# Patient Record
Sex: MALE | Race: WHITE | Employment: OTHER | URBAN - METROPOLITAN AREA
[De-identification: names, ages, dates, MRNs, and addresses within clinical notes are randomized per-mention and may not be internally consistent; named-entity substitution may affect disease eponyms.]

---

## 2017-05-24 DIAGNOSIS — E03.9 HYPOTHYROIDISM: ICD-10-CM

## 2017-05-25 ENCOUNTER — TELEPHONE (OUTPATIENT)
Dept: INTERNAL MEDICINE CLINIC | Age: 77
End: 2017-05-25

## 2017-05-25 ENCOUNTER — NURSE ONLY (OUTPATIENT)
Dept: INTERNAL MEDICINE CLINIC | Age: 77
End: 2017-05-25

## 2017-05-25 DIAGNOSIS — E03.8 OTHER SPECIFIED HYPOTHYROIDISM: Primary | ICD-10-CM

## 2017-05-25 DIAGNOSIS — E03.8 OTHER SPECIFIED HYPOTHYROIDISM: ICD-10-CM

## 2017-05-25 DIAGNOSIS — Z00.00 ROUTINE GENERAL MEDICAL EXAMINATION AT A HEALTH CARE FACILITY: ICD-10-CM

## 2017-05-25 LAB
A/G RATIO: 1.8 (ref 1.1–2.2)
ALBUMIN SERPL-MCNC: 4.3 G/DL (ref 3.4–5)
ALP BLD-CCNC: 48 U/L (ref 40–129)
ALT SERPL-CCNC: 18 U/L (ref 10–40)
ANION GAP SERPL CALCULATED.3IONS-SCNC: 15 MMOL/L (ref 3–16)
AST SERPL-CCNC: 17 U/L (ref 15–37)
BILIRUB SERPL-MCNC: 0.6 MG/DL (ref 0–1)
BUN BLDV-MCNC: 23 MG/DL (ref 7–20)
CALCIUM SERPL-MCNC: 8.7 MG/DL (ref 8.3–10.6)
CHLORIDE BLD-SCNC: 103 MMOL/L (ref 99–110)
CHOLESTEROL, TOTAL: 163 MG/DL (ref 0–199)
CO2: 23 MMOL/L (ref 21–32)
CREAT SERPL-MCNC: 1.1 MG/DL (ref 0.8–1.3)
GFR AFRICAN AMERICAN: >60
GFR NON-AFRICAN AMERICAN: >60
GLOBULIN: 2.4 G/DL
GLUCOSE BLD-MCNC: 101 MG/DL (ref 70–99)
HCT VFR BLD CALC: 39.8 % (ref 40.5–52.5)
HDLC SERPL-MCNC: 57 MG/DL (ref 40–60)
HEMOGLOBIN: 13.2 G/DL (ref 13.5–17.5)
LDL CHOLESTEROL CALCULATED: 96 MG/DL
MCH RBC QN AUTO: 31.3 PG (ref 26–34)
MCHC RBC AUTO-ENTMCNC: 33.3 G/DL (ref 31–36)
MCV RBC AUTO: 94.1 FL (ref 80–100)
PDW BLD-RTO: 14.7 % (ref 12.4–15.4)
PLATELET # BLD: 221 K/UL (ref 135–450)
PMV BLD AUTO: 8.5 FL (ref 5–10.5)
POTASSIUM SERPL-SCNC: 4.6 MMOL/L (ref 3.5–5.1)
RBC # BLD: 4.23 M/UL (ref 4.2–5.9)
SODIUM BLD-SCNC: 141 MMOL/L (ref 136–145)
TOTAL PROTEIN: 6.7 G/DL (ref 6.4–8.2)
TRIGL SERPL-MCNC: 52 MG/DL (ref 0–150)
TSH SERPL DL<=0.05 MIU/L-ACNC: 3.25 UIU/ML (ref 0.27–4.2)
VLDLC SERPL CALC-MCNC: 10 MG/DL
WBC # BLD: 5.6 K/UL (ref 4–11)

## 2017-05-25 PROCEDURE — 36415 COLL VENOUS BLD VENIPUNCTURE: CPT | Performed by: INTERNAL MEDICINE

## 2017-05-25 RX ORDER — LEVOTHYROXINE SODIUM 0.07 MG/1
TABLET ORAL
Qty: 90 TABLET | Refills: 2 | Status: SHIPPED | OUTPATIENT
Start: 2017-05-25 | End: 2018-02-12 | Stop reason: SDUPTHER

## 2017-06-06 ENCOUNTER — OFFICE VISIT (OUTPATIENT)
Dept: INTERNAL MEDICINE CLINIC | Age: 77
End: 2017-06-06

## 2017-06-06 VITALS
WEIGHT: 178.5 LBS | DIASTOLIC BLOOD PRESSURE: 68 MMHG | TEMPERATURE: 97.7 F | BODY MASS INDEX: 25.8 KG/M2 | HEART RATE: 72 BPM | SYSTOLIC BLOOD PRESSURE: 120 MMHG

## 2017-06-06 DIAGNOSIS — M70.31 BURSITIS OF RIGHT ELBOW: Primary | ICD-10-CM

## 2017-06-06 DIAGNOSIS — D64.9 ANEMIA, UNSPECIFIED TYPE: ICD-10-CM

## 2017-06-06 PROCEDURE — 99214 OFFICE O/P EST MOD 30 MIN: CPT | Performed by: INTERNAL MEDICINE

## 2017-06-06 RX ORDER — DOXYCYCLINE HYCLATE 100 MG
100 TABLET ORAL 2 TIMES DAILY
Qty: 20 TABLET | Refills: 0 | Status: SHIPPED | OUTPATIENT
Start: 2017-06-06 | End: 2017-06-16

## 2017-06-06 ASSESSMENT — ENCOUNTER SYMPTOMS
COLOR CHANGE: 1
BACK PAIN: 0
SINUS PRESSURE: 0
EYES NEGATIVE: 1
CHOKING: 0
APNEA: 0
STRIDOR: 0

## 2017-08-14 DIAGNOSIS — N40.0 BPH (BENIGN PROSTATIC HYPERPLASIA): ICD-10-CM

## 2017-08-14 RX ORDER — TAMSULOSIN HYDROCHLORIDE 0.4 MG/1
CAPSULE ORAL
Qty: 90 CAPSULE | Refills: 1 | Status: SHIPPED | OUTPATIENT
Start: 2017-08-14 | End: 2018-02-09 | Stop reason: SDUPTHER

## 2017-09-14 ENCOUNTER — OFFICE VISIT (OUTPATIENT)
Dept: INTERNAL MEDICINE CLINIC | Age: 77
End: 2017-09-14

## 2017-09-14 VITALS
HEART RATE: 82 BPM | DIASTOLIC BLOOD PRESSURE: 80 MMHG | TEMPERATURE: 98.3 F | HEIGHT: 72 IN | OXYGEN SATURATION: 95 % | WEIGHT: 175 LBS | SYSTOLIC BLOOD PRESSURE: 130 MMHG | BODY MASS INDEX: 23.7 KG/M2

## 2017-09-14 DIAGNOSIS — J98.8 VIRAL RESPIRATORY ILLNESS: Primary | ICD-10-CM

## 2017-09-14 DIAGNOSIS — B97.89 VIRAL RESPIRATORY ILLNESS: Primary | ICD-10-CM

## 2017-09-14 DIAGNOSIS — J30.9 ALLERGIC RHINITIS, UNSPECIFIED ALLERGIC RHINITIS TRIGGER, UNSPECIFIED RHINITIS SEASONALITY: ICD-10-CM

## 2017-09-14 PROCEDURE — 99213 OFFICE O/P EST LOW 20 MIN: CPT | Performed by: NURSE PRACTITIONER

## 2017-09-14 RX ORDER — FLUTICASONE PROPIONATE 50 MCG
1 SPRAY, SUSPENSION (ML) NASAL 2 TIMES DAILY
Qty: 1 BOTTLE | Refills: 0 | Status: SHIPPED | OUTPATIENT
Start: 2017-09-14 | End: 2018-05-22

## 2017-09-14 RX ORDER — LORATADINE AND PSEUDOEPHEDRINE 10; 240 MG/1; MG/1
1 TABLET, EXTENDED RELEASE ORAL DAILY
Qty: 30 TABLET | Refills: 0 | Status: SHIPPED | OUTPATIENT
Start: 2017-09-14

## 2017-09-14 ASSESSMENT — PATIENT HEALTH QUESTIONNAIRE - PHQ9
SUM OF ALL RESPONSES TO PHQ QUESTIONS 1-9: 0
1. LITTLE INTEREST OR PLEASURE IN DOING THINGS: 0
SUM OF ALL RESPONSES TO PHQ9 QUESTIONS 1 & 2: 0
2. FEELING DOWN, DEPRESSED OR HOPELESS: 0

## 2017-09-14 ASSESSMENT — ENCOUNTER SYMPTOMS
NAUSEA: 0
SORE THROAT: 1
DIARRHEA: 0
WHEEZING: 0
COUGH: 1
RHINORRHEA: 1

## 2017-12-22 ENCOUNTER — TELEPHONE (OUTPATIENT)
Dept: ADMINISTRATIVE | Age: 77
End: 2017-12-22

## 2017-12-22 DIAGNOSIS — D64.9 ANEMIA, UNSPECIFIED TYPE: Primary | ICD-10-CM

## 2017-12-22 DIAGNOSIS — E03.8 OTHER SPECIFIED HYPOTHYROIDISM: ICD-10-CM

## 2017-12-22 NOTE — TELEPHONE ENCOUNTER
Please apply the appropriate labs for this patient's Thyroid check. Luís Gan is scheduled for 12/26/17 at 8:30am. Thank you.

## 2017-12-26 ENCOUNTER — NURSE ONLY (OUTPATIENT)
Dept: INTERNAL MEDICINE CLINIC | Age: 77
End: 2017-12-26

## 2017-12-26 DIAGNOSIS — D64.9 ANEMIA, UNSPECIFIED TYPE: ICD-10-CM

## 2017-12-26 DIAGNOSIS — E03.8 OTHER SPECIFIED HYPOTHYROIDISM: ICD-10-CM

## 2017-12-26 LAB
HCT VFR BLD CALC: 41.5 % (ref 40.5–52.5)
HEMOGLOBIN: 13.8 G/DL (ref 13.5–17.5)
MCH RBC QN AUTO: 31.6 PG (ref 26–34)
MCHC RBC AUTO-ENTMCNC: 33.2 G/DL (ref 31–36)
MCV RBC AUTO: 95.3 FL (ref 80–100)
PDW BLD-RTO: 15 % (ref 12.4–15.4)
PLATELET # BLD: 236 K/UL (ref 135–450)
PMV BLD AUTO: 8.4 FL (ref 5–10.5)
RBC # BLD: 4.35 M/UL (ref 4.2–5.9)
TSH SERPL DL<=0.05 MIU/L-ACNC: 2.72 UIU/ML (ref 0.27–4.2)
WBC # BLD: 6.5 K/UL (ref 4–11)

## 2017-12-26 PROCEDURE — 36415 COLL VENOUS BLD VENIPUNCTURE: CPT | Performed by: INTERNAL MEDICINE

## 2018-02-09 DIAGNOSIS — N40.0 BPH (BENIGN PROSTATIC HYPERPLASIA): ICD-10-CM

## 2018-02-09 RX ORDER — TAMSULOSIN HYDROCHLORIDE 0.4 MG/1
CAPSULE ORAL
Qty: 90 CAPSULE | Refills: 0 | Status: SHIPPED | OUTPATIENT
Start: 2018-02-09 | End: 2018-02-12 | Stop reason: SDUPTHER

## 2018-02-12 DIAGNOSIS — E03.9 HYPOTHYROIDISM, UNSPECIFIED TYPE: ICD-10-CM

## 2018-02-12 RX ORDER — TAMSULOSIN HYDROCHLORIDE 0.4 MG/1
CAPSULE ORAL
Qty: 90 CAPSULE | Refills: 0 | Status: SHIPPED | OUTPATIENT
Start: 2018-02-12 | End: 2018-08-09 | Stop reason: SDUPTHER

## 2018-02-12 RX ORDER — LEVOTHYROXINE SODIUM 0.07 MG/1
TABLET ORAL
Qty: 90 TABLET | Refills: 2 | Status: SHIPPED | OUTPATIENT
Start: 2018-02-12 | End: 2018-02-21 | Stop reason: SDUPTHER

## 2018-02-21 DIAGNOSIS — E03.9 HYPOTHYROIDISM, UNSPECIFIED TYPE: ICD-10-CM

## 2018-02-22 RX ORDER — LEVOTHYROXINE SODIUM 0.07 MG/1
TABLET ORAL
Qty: 90 TABLET | Refills: 1 | Status: SHIPPED | OUTPATIENT
Start: 2018-02-22 | End: 2018-11-07 | Stop reason: SDUPTHER

## 2018-04-02 ENCOUNTER — OFFICE VISIT (OUTPATIENT)
Dept: INTERNAL MEDICINE CLINIC | Age: 78
End: 2018-04-02

## 2018-04-02 VITALS
HEART RATE: 75 BPM | DIASTOLIC BLOOD PRESSURE: 82 MMHG | WEIGHT: 186 LBS | TEMPERATURE: 98.3 F | SYSTOLIC BLOOD PRESSURE: 130 MMHG | OXYGEN SATURATION: 96 % | BODY MASS INDEX: 25.23 KG/M2

## 2018-04-02 DIAGNOSIS — M70.22 OLECRANON BURSITIS OF LEFT ELBOW: Primary | ICD-10-CM

## 2018-04-02 DIAGNOSIS — Z91.81 AT HIGH RISK FOR FALLS: ICD-10-CM

## 2018-04-02 DIAGNOSIS — J45.20 MILD INTERMITTENT ASTHMA WITHOUT COMPLICATION: ICD-10-CM

## 2018-04-02 DIAGNOSIS — E03.8 OTHER SPECIFIED HYPOTHYROIDISM: ICD-10-CM

## 2018-04-02 PROCEDURE — 20605 DRAIN/INJ JOINT/BURSA W/O US: CPT | Performed by: INTERNAL MEDICINE

## 2018-04-02 PROCEDURE — 99214 OFFICE O/P EST MOD 30 MIN: CPT | Performed by: INTERNAL MEDICINE

## 2018-04-02 RX ORDER — METHYLPREDNISOLONE ACETATE 80 MG/ML
80 INJECTION, SUSPENSION INTRA-ARTICULAR; INTRALESIONAL; INTRAMUSCULAR; SOFT TISSUE ONCE
Status: COMPLETED | OUTPATIENT
Start: 2018-04-02 | End: 2018-04-02

## 2018-04-02 RX ADMIN — METHYLPREDNISOLONE ACETATE 80 MG: 80 INJECTION, SUSPENSION INTRA-ARTICULAR; INTRALESIONAL; INTRAMUSCULAR; SOFT TISSUE at 15:28

## 2018-04-02 ASSESSMENT — ENCOUNTER SYMPTOMS
CRAMPS: 1
COLOR CHANGE: 1

## 2018-05-07 ENCOUNTER — OFFICE VISIT (OUTPATIENT)
Dept: INTERNAL MEDICINE CLINIC | Age: 78
End: 2018-05-07

## 2018-05-07 VITALS
TEMPERATURE: 98 F | SYSTOLIC BLOOD PRESSURE: 130 MMHG | HEIGHT: 72 IN | DIASTOLIC BLOOD PRESSURE: 80 MMHG | BODY MASS INDEX: 24.65 KG/M2 | WEIGHT: 182 LBS

## 2018-05-07 DIAGNOSIS — E03.8 OTHER SPECIFIED HYPOTHYROIDISM: ICD-10-CM

## 2018-05-07 DIAGNOSIS — C44.91 BASAL CELL CARCINOMA, UNSPECIFIED SITE: ICD-10-CM

## 2018-05-07 DIAGNOSIS — L27.0: Primary | ICD-10-CM

## 2018-05-07 PROCEDURE — 99214 OFFICE O/P EST MOD 30 MIN: CPT | Performed by: INTERNAL MEDICINE

## 2018-05-07 RX ORDER — BETAMETHASONE DIPROPIONATE 0.5 MG/G
CREAM TOPICAL
Qty: 15 G | Refills: 1 | Status: SHIPPED | OUTPATIENT
Start: 2018-05-07 | End: 2018-06-06

## 2018-05-07 ASSESSMENT — ENCOUNTER SYMPTOMS
COLOR CHANGE: 1
VOMITING: 0
RESPIRATORY NEGATIVE: 1

## 2018-05-22 ENCOUNTER — PAT TELEPHONE (OUTPATIENT)
Dept: PREADMISSION TESTING | Age: 78
End: 2018-05-22

## 2018-05-22 VITALS — BODY MASS INDEX: 23.7 KG/M2 | HEIGHT: 72 IN | WEIGHT: 175 LBS

## 2018-05-30 ENCOUNTER — HOSPITAL ENCOUNTER (OUTPATIENT)
Dept: ENDOSCOPY | Age: 78
Discharge: HOME | End: 2018-05-30
Attending: INTERNAL MEDICINE | Admitting: INTERNAL MEDICINE

## 2018-05-30 VITALS
RESPIRATION RATE: 16 BRPM | HEART RATE: 57 BPM | DIASTOLIC BLOOD PRESSURE: 65 MMHG | OXYGEN SATURATION: 97 % | HEIGHT: 72 IN | TEMPERATURE: 97.4 F | BODY MASS INDEX: 23.5 KG/M2 | WEIGHT: 173.5 LBS | SYSTOLIC BLOOD PRESSURE: 127 MMHG

## 2018-05-30 DIAGNOSIS — Z80.0 FAMILY HISTORY OF MALIGNANT NEOPLASM OF DIGESTIVE ORGAN: ICD-10-CM

## 2018-05-30 RX ORDER — SODIUM CHLORIDE 9 MG/ML
INJECTION, SOLUTION INTRAVENOUS CONTINUOUS
Status: DISCONTINUED | OUTPATIENT
Start: 2018-05-30 | End: 2018-05-31 | Stop reason: HOSPADM

## 2018-05-30 RX ORDER — PROMETHAZINE HYDROCHLORIDE 25 MG/ML
6.25 INJECTION, SOLUTION INTRAMUSCULAR; INTRAVENOUS
Status: ACTIVE | OUTPATIENT
Start: 2018-05-30 | End: 2018-05-30

## 2018-05-30 RX ORDER — ONDANSETRON 2 MG/ML
4 INJECTION INTRAMUSCULAR; INTRAVENOUS
Status: ACTIVE | OUTPATIENT
Start: 2018-05-30 | End: 2018-05-30

## 2018-05-30 RX ORDER — SODIUM CHLORIDE 0.9 % (FLUSH) 0.9 %
10 SYRINGE (ML) INJECTION PRN
Status: DISCONTINUED | OUTPATIENT
Start: 2018-05-30 | End: 2018-05-31 | Stop reason: HOSPADM

## 2018-05-30 RX ORDER — LABETALOL HYDROCHLORIDE 5 MG/ML
5 INJECTION, SOLUTION INTRAVENOUS EVERY 10 MIN PRN
Status: DISCONTINUED | OUTPATIENT
Start: 2018-05-30 | End: 2018-05-31 | Stop reason: HOSPADM

## 2018-05-30 RX ORDER — SODIUM CHLORIDE 0.9 % (FLUSH) 0.9 %
10 SYRINGE (ML) INJECTION EVERY 12 HOURS SCHEDULED
Status: DISCONTINUED | OUTPATIENT
Start: 2018-05-30 | End: 2018-05-31 | Stop reason: HOSPADM

## 2018-05-30 RX ADMIN — SODIUM CHLORIDE: 9 INJECTION, SOLUTION INTRAVENOUS at 08:50

## 2018-05-30 ASSESSMENT — PAIN - FUNCTIONAL ASSESSMENT: PAIN_FUNCTIONAL_ASSESSMENT: 0-10

## 2018-05-30 ASSESSMENT — PAIN SCALES - GENERAL
PAINLEVEL_OUTOF10: 0
PAINLEVEL_OUTOF10: 0

## 2018-05-30 ASSESSMENT — PAIN SCALES - WONG BAKER: WONGBAKER_NUMERICALRESPONSE: 0

## 2018-08-09 RX ORDER — TAMSULOSIN HYDROCHLORIDE 0.4 MG/1
CAPSULE ORAL
Qty: 90 CAPSULE | Refills: 0 | Status: SHIPPED | OUTPATIENT
Start: 2018-08-09 | End: 2018-11-05 | Stop reason: SDUPTHER

## 2018-11-05 RX ORDER — TAMSULOSIN HYDROCHLORIDE 0.4 MG/1
CAPSULE ORAL
Qty: 90 CAPSULE | Refills: 0 | Status: SHIPPED | OUTPATIENT
Start: 2018-11-05 | End: 2019-02-06 | Stop reason: SDUPTHER

## 2018-11-07 DIAGNOSIS — E03.9 HYPOTHYROIDISM, UNSPECIFIED TYPE: ICD-10-CM

## 2018-11-08 RX ORDER — LEVOTHYROXINE SODIUM 0.07 MG/1
TABLET ORAL
Qty: 90 TABLET | Refills: 0 | Status: SHIPPED | OUTPATIENT
Start: 2018-11-08 | End: 2019-02-06 | Stop reason: SDUPTHER

## 2018-11-12 ENCOUNTER — NURSE ONLY (OUTPATIENT)
Dept: INTERNAL MEDICINE CLINIC | Age: 78
End: 2018-11-12
Payer: MEDICARE

## 2018-11-12 ENCOUNTER — TELEPHONE (OUTPATIENT)
Dept: INTERNAL MEDICINE CLINIC | Age: 78
End: 2018-11-12

## 2018-11-12 DIAGNOSIS — D64.9 ANEMIA, UNSPECIFIED TYPE: ICD-10-CM

## 2018-11-12 DIAGNOSIS — Z00.00 ROUTINE GENERAL MEDICAL EXAMINATION AT A HEALTH CARE FACILITY: Primary | ICD-10-CM

## 2018-11-12 DIAGNOSIS — E03.8 OTHER SPECIFIED HYPOTHYROIDISM: ICD-10-CM

## 2018-11-12 DIAGNOSIS — Z00.00 ROUTINE GENERAL MEDICAL EXAMINATION AT A HEALTH CARE FACILITY: ICD-10-CM

## 2018-11-12 LAB
A/G RATIO: 1.7 (ref 1.1–2.2)
ALBUMIN SERPL-MCNC: 4.4 G/DL (ref 3.4–5)
ALP BLD-CCNC: 55 U/L (ref 40–129)
ALT SERPL-CCNC: 16 U/L (ref 10–40)
ANION GAP SERPL CALCULATED.3IONS-SCNC: 16 MMOL/L (ref 3–16)
AST SERPL-CCNC: 19 U/L (ref 15–37)
BILIRUB SERPL-MCNC: 0.4 MG/DL (ref 0–1)
BUN BLDV-MCNC: 20 MG/DL (ref 7–20)
CALCIUM SERPL-MCNC: 9.1 MG/DL (ref 8.3–10.6)
CHLORIDE BLD-SCNC: 105 MMOL/L (ref 99–110)
CHOLESTEROL, TOTAL: 160 MG/DL (ref 0–199)
CO2: 22 MMOL/L (ref 21–32)
CREAT SERPL-MCNC: 1.2 MG/DL (ref 0.8–1.3)
GFR AFRICAN AMERICAN: >60
GFR NON-AFRICAN AMERICAN: 59
GLOBULIN: 2.6 G/DL
GLUCOSE BLD-MCNC: 155 MG/DL (ref 70–99)
HCT VFR BLD CALC: 42.1 % (ref 40.5–52.5)
HDLC SERPL-MCNC: 58 MG/DL (ref 40–60)
HEMOGLOBIN: 13.7 G/DL (ref 13.5–17.5)
LDL CHOLESTEROL CALCULATED: 88 MG/DL
MCH RBC QN AUTO: 31.7 PG (ref 26–34)
MCHC RBC AUTO-ENTMCNC: 32.6 G/DL (ref 31–36)
MCV RBC AUTO: 97 FL (ref 80–100)
PDW BLD-RTO: 15.1 % (ref 12.4–15.4)
PLATELET # BLD: 257 K/UL (ref 135–450)
PMV BLD AUTO: 8.8 FL (ref 5–10.5)
POTASSIUM SERPL-SCNC: 4.7 MMOL/L (ref 3.5–5.1)
RBC # BLD: 4.34 M/UL (ref 4.2–5.9)
SODIUM BLD-SCNC: 143 MMOL/L (ref 136–145)
TOTAL PROTEIN: 7 G/DL (ref 6.4–8.2)
TRIGL SERPL-MCNC: 72 MG/DL (ref 0–150)
TSH SERPL DL<=0.05 MIU/L-ACNC: 3.04 UIU/ML (ref 0.27–4.2)
VLDLC SERPL CALC-MCNC: 14 MG/DL
WBC # BLD: 5.9 K/UL (ref 4–11)

## 2018-11-12 PROCEDURE — 36415 COLL VENOUS BLD VENIPUNCTURE: CPT | Performed by: INTERNAL MEDICINE

## 2018-11-20 ENCOUNTER — TELEPHONE (OUTPATIENT)
Dept: INTERNAL MEDICINE CLINIC | Age: 78
End: 2018-11-20

## 2018-11-20 ENCOUNTER — OFFICE VISIT (OUTPATIENT)
Dept: INTERNAL MEDICINE CLINIC | Age: 78
End: 2018-11-20
Payer: MEDICARE

## 2018-11-20 VITALS
BODY MASS INDEX: 24.82 KG/M2 | DIASTOLIC BLOOD PRESSURE: 72 MMHG | TEMPERATURE: 98.3 F | HEART RATE: 72 BPM | SYSTOLIC BLOOD PRESSURE: 132 MMHG | OXYGEN SATURATION: 96 % | WEIGHT: 183 LBS

## 2018-11-20 DIAGNOSIS — J45.20 MILD INTERMITTENT ASTHMA WITHOUT COMPLICATION: ICD-10-CM

## 2018-11-20 DIAGNOSIS — R73.03 PRE-DIABETES: Primary | ICD-10-CM

## 2018-11-20 DIAGNOSIS — E03.8 OTHER SPECIFIED HYPOTHYROIDISM: ICD-10-CM

## 2018-11-20 DIAGNOSIS — R73.9 HYPERGLYCEMIA: Primary | ICD-10-CM

## 2018-11-20 PROCEDURE — 99213 OFFICE O/P EST LOW 20 MIN: CPT | Performed by: INTERNAL MEDICINE

## 2018-11-20 ASSESSMENT — ENCOUNTER SYMPTOMS
SINUS PRESSURE: 0
APNEA: 0
STRIDOR: 0
EYES NEGATIVE: 1
COLOR CHANGE: 0
CHOKING: 0
BACK PAIN: 0

## 2018-11-20 NOTE — PATIENT INSTRUCTIONS
Please call your pharmacy if you need any refills of your medication(s). Please call our office at [phone] if you don't hear from us about your test results. Bring an accurate list of your medications with you at every appointment to ensure that we have the correct information.     Our office hours are: Monday - Friday 8:30 am- 5 pm    Phone lines turn on at 8:30 am

## 2018-11-20 NOTE — PROGRESS NOTES
Subjective:      Patient ID: Brando Tilley is a 70 y.o. male. Patient presents with: hyperglycemia with . Diabetes: new diagnosis of diabetes, states he's been eating a lot of candy. Brando Tilley is a 70 y.o. male with the following history as recorded in EpicCare:  Patient Active Problem List    Anemia         Date Noted: 07/22/2011      Hypothyroidism         Date Noted: 07/22/2011      Asthma         Date Noted: 06/17/2010      Basal cell carcinoma         Date Noted: 06/17/2010      Current Outpatient Prescriptions:  levothyroxine (SYNTHROID) 75 MCG tablet, TAKE ONE TABLET BY MOUTH DAILY, Disp: 90 tablet, Rfl: 0  tamsulosin (FLOMAX) 0.4 MG capsule, TAKE ONE CAPSULE BY MOUTH DAILY, Disp: 90 capsule, Rfl: 0  loratadine-pseudoephedrine (CLARITIN-D 24 HOUR)  MG per extended release tablet, Take 1 tablet by mouth daily, Disp: 30 tablet, Rfl: 0  RaNITidine HCl (RANITIDINE ACID REDUCER PO), Take by mouth, Disp: , Rfl:   Fiber CAPS, Take  by mouth daily. , Disp: , Rfl:   fish oil-omega-3 fatty acids 1000 MG capsule, Take 2 g by mouth daily. , Disp: , Rfl:   MULTIPLE VITAMIN PO, Take  by mouth daily. , Disp: , Rfl:     No current facility-administered medications for this visit. Allergies: Patient has no known allergies. Past Medical History:  6/17/2010: Basal cell carcinoma  No date: BPH (benign prostatic hyperplasia)  No date: COPD (chronic obstructive pulmonary disease) (*  No date: Detached retina      Comment: left eye  No date: GERD (gastroesophageal reflux disease)  No date: Hypothyroidism  Past Surgical History:  2009: COLONOSCOPY      Comment: polyps.   05/2018: COLONOSCOPY      Comment:   5/11: EYE SURGERY      Comment: left detached retina  1990's: HEMORRHOID SURGERY  Review of patient's family history indicates:  Problem: Cancer      Relation: Mother       Age of Onset: (Not Specified)       Comment: ovarian  Problem: Cancer      Relation: Father       Age of Onset:

## 2019-02-06 DIAGNOSIS — E03.9 HYPOTHYROIDISM, UNSPECIFIED TYPE: ICD-10-CM

## 2019-02-07 RX ORDER — LEVOTHYROXINE SODIUM 0.07 MG/1
TABLET ORAL
Qty: 90 TABLET | Refills: 0 | Status: SHIPPED | OUTPATIENT
Start: 2019-02-07 | End: 2021-10-29 | Stop reason: SDUPTHER

## 2019-02-07 RX ORDER — TAMSULOSIN HYDROCHLORIDE 0.4 MG/1
CAPSULE ORAL
Qty: 90 CAPSULE | Refills: 0 | Status: SHIPPED | OUTPATIENT
Start: 2019-02-07 | End: 2021-09-16

## 2020-12-07 DIAGNOSIS — R06.02 SOB (SHORTNESS OF BREATH) ON EXERTION: Primary | ICD-10-CM

## 2020-12-09 ENCOUNTER — HOSPITAL ENCOUNTER (OUTPATIENT)
Dept: CARDIAC REHAB | Age: 80
Setting detail: THERAPIES SERIES
Discharge: HOME | End: 2020-12-09
Payer: MEDICARE

## 2020-12-09 PROCEDURE — 94618 PULMONARY STRESS TESTING: CPT

## 2020-12-09 PROCEDURE — 94618 PULMONARY STRESS TESTING: CPT | Performed by: INTERNAL MEDICINE

## 2020-12-09 NOTE — PROCEDURES
[address] [facility] Cardiopulmonary Rehabilitation   Six Minute Walk Test    Eric Kerr  MIKE JOSHUAB: 1940  Account Number: [de-identified]  AGE: 68 y.o.     OP test [x]   Pulmonary Rehab PRE []  POST   []    Diagnosis: Shortness of breath on exertion      Referring Physician: Dr. Shankar Jones    Gender [x]  male [] female AGE: 68     Height: 6 ft 0 in 183 cm    Weight: 187 lbs 85 kg  Blood Pressure 142/74    Medications affecting heart rate:  none      Supplemental O2 during the test [x]  no [] yes  lpm     [] continuous []  intermittent    Device : [] NC []  HFNC    []  oximizer  [] mask      Laps completed: 13 (1 lap = 100 ft)        Baseline (resting) Walking End of Test (recovery)      Heart Rate 71   89  70    BP  142/74   150/80  138/78    Dyspnea 2   3  2 (Elizabeth scale)    Fatigue 2   5  2 (Elizabeth scale)    SpO2  96%   93%  97%         Stopped or paused before 6 mins? [x]  no [] yes How long? Symptoms at end of exercise:[] angina  [] dizziness  [x]  leg pain       []  no complaints []  other    Number of laps 13 (x 30.48 meters)= 396 + final partial lap: 24 meters    Total distance walked in 6 mins: 420 meters    Predicted distance: 535 meters  Percent predicted: 78.5%              [x] normal       [] reduced     Summary: This is an outpatient 6 minute walk test, performed on room air. The patient ambulated 420 meters which is normal-79% predicted. His, heart rate response was normal, the blood pressure response was normal, oxygen saturations were normal.  The patient desaturated to 93% while ambulating on room air. The degree of symptoms based on the Elizabeth Dyspnea/Fatigue scale were increased with testing. This test does not indicate the need for supplemental oxygen.             Nicko Marcelo DO  Pulmonary Rehab Director

## 2020-12-11 ENCOUNTER — OFFICE VISIT (OUTPATIENT)
Dept: PULMONOLOGY | Age: 80
End: 2020-12-11
Payer: MEDICARE

## 2020-12-11 VITALS
SYSTOLIC BLOOD PRESSURE: 150 MMHG | WEIGHT: 186.4 LBS | OXYGEN SATURATION: 93 % | TEMPERATURE: 98.6 F | RESPIRATION RATE: 12 BRPM | DIASTOLIC BLOOD PRESSURE: 80 MMHG | HEIGHT: 72 IN | HEART RATE: 79 BPM | BODY MASS INDEX: 25.25 KG/M2

## 2020-12-11 PROBLEM — R25.2 LEG CRAMP: Status: ACTIVE | Noted: 2020-12-11

## 2020-12-11 PROCEDURE — 99214 OFFICE O/P EST MOD 30 MIN: CPT | Performed by: INTERNAL MEDICINE

## 2020-12-11 RX ORDER — UMECLIDINIUM BROMIDE AND VILANTEROL TRIFENATATE 62.5; 25 UG/1; UG/1
1 POWDER RESPIRATORY (INHALATION) DAILY
Qty: 1 EACH | Refills: 0 | COMMUNITY
Start: 2020-12-11 | End: 2021-03-01

## 2020-12-11 NOTE — ASSESSMENT & PLAN NOTE
The patient has leg cramping and numbness of his right more than left leg. This is not reproducible with exertion and is greatest with sleeping and with squatting down. While this could be peripheral vascular disease, he has easily palpable dorsalis pedis and posterior tibial pulses. Therefore, I will start with a cardiac stress test first and then consider peripheral vascular disease work-up next.   Given the easily detected pulses in the lower extremities, MRI may be more beneficial.

## 2020-12-11 NOTE — PROGRESS NOTES
REASON FOR CONSULTATION/CC:    Chief Complaint   Patient presents with    Shortness of Breath         PCP: Gurpreet Glass MD    HISTORY OF PRESENT ILLNESS: Trisha Aly is a 68y.o. year old male with a history of  Tobacco abuse who presents       He complains of shortness of breath. He states that 1 year ago he was waking 10 miles per day. He had a bad cold in [place] ~ one year ago. Over the last 6 months, his is very limited from walking 300 ft or less with grade. He will monitor pulse ox with walking and will desaturate to 88%. He will have chest tightness with walking that is not improved albuterol or Symbicort. He used Symbicort sample to completion with complains of hoarseness but no change in shortness of breath / dyspnea on exertion     pulmonary function tests scheduled at this point. EKG completed. No stress test.        Tobacco.  Quit at age of 50. He is get leg cramps in the thigh > at night with numbness. PAST MEDICAL HISTORY:  Past Medical History:   Diagnosis Date    Basal cell carcinoma 6/17/2010    BPH (benign prostatic hyperplasia)     COPD (chronic obstructive pulmonary disease) ([address]     Detached retina     left eye    GERD (gastroesophageal reflux disease)     Hypothyroidism        PAST SURGICAL HISTORY:  Past Surgical History:   Procedure Laterality Date    COLONOSCOPY  2009    polyps.  COLONOSCOPY  05/2018        EYE SURGERY  5/11    left detached retina    HEMORRHOID SURGERY  1990's       FAMILY HISTORY:  family history includes Cancer in his father and mother; Cancer (age of onset: 61) in his brother. No heart disease. SOCIAL HISTORY:   reports that he quit smoking about 30 years ago. His smoking use included cigarettes. He has a 50.00 pack-year smoking history. He has never used smokeless tobacco.      ALLERGIES:  Patient has No Known Allergies.     REVIEW OF SYSTEMS:  Constitutional: Negative for fever    HENT: Negative for sore throat  Eyes: Negative for redness   Respiratory: ++ for dyspnea, - cough  Cardiovascular: Negative for chest pain  Gastrointestinal: Negative for vomiting, diarrhea   Genitourinary: Negative for hematuria   Musculoskeletal: Negative for arthralgias  + leg cramping   Skin: Negative for rash  Neurological: Negative for syncope  Hematological: Negative for adenopathy  Psychiatric/Behavorial: Negative for anxiety    Objective:   PHYSICAL EXAM:  Blood pressure (!) 150/80, pulse 79, temperature 98.6 °F (37 °C), temperature source Temporal, resp. rate 12, height 6' (1.829 m), weight 186 lb 6.4 oz (84.6 kg), SpO2 93 %.'  Gen: No distress. Eyes: PERRL. No sclera icterus. No conjunctival injection. ENT: No discharge. Pharynx clear. External appearance of ears and nose normal.  Neck: Trachea midline. No obvious mass. Resp: No accessory muscle use. No crackles. No wheezes. No rhonchi. CV: Regular rate. Regular rhythm. No murmur or rub. No edema. Intact bilateral dorsalis pedis and posterior tibial pulses. GI: Non-tender. Non-distended. No hernia. Skin: Warm, dry, normal texture and turgor. No nodule on exposed extremities. Lymph: No cervical LAD. No supraclavicular LAD. M/S: No cyanosis. No clubbing. No joint deformity. Neuro: Moves all four extremities. Psych: Oriented x 3. No anxiety. Awake. Alert. Intact judgement and insight.      Current Outpatient Medications   Medication Sig Dispense Refill    umeclidinium-vilanterol (ANORO ELLIPTA) 62.5-25 MCG/INH AEPB inhaler Inhale 1 puff into the lungs daily 1 each 0    levothyroxine (SYNTHROID) 75 MCG tablet TAKE ONE TABLET BY MOUTH DAILY 90 tablet 0    tamsulosin (FLOMAX) 0.4 MG capsule TAKE ONE CAPSULE BY MOUTH DAILY 90 capsule 0    loratadine-pseudoephedrine (CLARITIN-D 24 HOUR)  MG per extended release tablet Take 1 tablet by mouth daily 30 tablet 0    RaNITidine HCl (RANITIDINE ACID REDUCER PO) Take by mouth      Fiber CAPS Take by mouth daily.  fish oil-omega-3 fatty acids 1000 MG capsule Take 2 g by mouth daily.  MULTIPLE VITAMIN PO Take  by mouth daily. No current facility-administered medications for this visit. Data Reviewed:   CBC and Renal reviewed  Last CBC  Lab Results   Component Value Date    WBC 5.9 11/12/2018    RBC 4.34 11/12/2018    HGB 13.7 11/12/2018    MCV 97.0 11/12/2018     11/12/2018     Last Renal  Lab Results   Component Value Date     11/12/2018    K 4.7 11/12/2018     11/12/2018    CO2 22 11/12/2018    CO2 23 05/25/2017    CO2 21 05/24/2016    BUN 20 11/12/2018    CREATININE 1.2 11/12/2018    GLUCOSE 155 11/12/2018    CALCIUM 9.1 11/12/2018       Last ABG  POC Blood Gas: No results found for: POCPH, POCPCO2, POCPO2, POCHCO3, NBEA, [id number]  No results for input(s): PH, PCO2, PO2, HCO3, BE, O2SAT in the last 72 hours. Radiology Review:  Pertinent images / reports were reviewed as a part of this visit. CT Chest w/ contrast: No results found for this or any previous visit. CT Chest w/o contrast:   Results for orders placed during the hospital encounter of 09/11/14   CT CHEST WO CONTRAST    Narrative INDICATIONS:  Abnormal chest x-ray     COMPARISON: CXR 9/4/2014            FINDINGS: The airways are patent. There are moderate emphysematous  changes in both lungs most pronounced in the upper lobes. There is  mild dependent atelectasis. There is cylindrical bronchiectasis in  both lower lobes. There are multiple densely calcified bilateral  pulmonary nodules with calcifications in the mediastinum and hilum  consistent with prior granulomatous infection. There are no  noncalcified nodules. There is no pleural effusion. The remainder  of the mediastinal structures are unremarkable. Limited evaluation of the upper abdomen demonstrates a 3 cm round  low-attenuation right adrenal mass. This measures 2.4 Hounsfield  units and is consistent with a benign adenoma. Impression IMPRESSION:   1. Bilateral pulmonary nodules are calcified and consistent with  prior granulomatous infection. 2. Moderate emphysema with bibasilar cylindrical bronchiectasis. 3. 3 cm benign right adrenal adenoma. CTPA: No results found for this or any previous visit. CXR PA/LAT:   Results for orders placed during the hospital encounter of 09/04/14   XR CHEST STANDARD TWO VW    Narrative PA & LATERAL CHEST X-RAY     INDICATION:     Cough        COMPARISON:  none     FINDINGS:  The cardiomediastinal silhouette and pulmonary  vascularity are within normal limits. benign calcified  granulomas are noted within the left lung in the right hilus. An  11 mm nipple shadow or noncalcified nodule overlies each lung base  additional nodules may be present within the posterior  costophrenic angles. Minimal scar or atelectasis is noted in the left lateral  costophrenic angle. Both lungs are hyperexpanded but otherwise  clear. Impression IMPRESSION: Multiple calcified versus noncalcified lung nodules. A  FOLLOWUP CT SCAN OF THE CHEST IS RECOMMENDED. Mild linear scar versus atelectasis at the left lung base  laterally. CXR portable: No results found for this or any previous visit. Assessment:     · Tobacco abuse  · Shortness of breath  · Exertional desaturation to 93 %, December 2020  · Calcified pulmonary nodule unchanged [id number]      Plan:      Problem List Items Addressed This Visit     SOB (shortness of breath) on exertion - Primary     With accelerating shortness of breath with exercise capacity decreasing from 10 miles a day down to 300 feet at a time for worse with incline, this is concerning for cardiac etiology. Therefore, stress test has been ordered. Pulmonary function test ordered and will be completed next week.   Sample of Anoro given and is to wait to use this until after completion of pulmonary function test.  Of note, he has not responded to albuterol or

## 2020-12-11 NOTE — ASSESSMENT & PLAN NOTE
With accelerating shortness of breath with exercise capacity decreasing from 10 miles a day down to 300 feet at a time for worse with incline, this is concerning for cardiac etiology. Therefore, stress test has been ordered. Pulmonary function test ordered and will be completed next week. Sample of Anoro given and is to wait to use this until after completion of pulmonary function test.  Of note, he has not responded to albuterol or Symbicort. Side effect hoarseness with the Symbicort.

## 2020-12-11 NOTE — LETTER
[facility]  [address] [facility]  Phone: [phone]  Fax: [phone]         December 11, 2020       Patient: Areli Silvestre   MR Number: <[id number]>   YOB: 1940   Date of Visit: 12/11/2020       Dear Dr. Mary Beth Banda MD     Areli Silvestre was a self-referral for shortness of breath. Please see my assessment and plan below. Problem List Items Addressed This Visit     SOB (shortness of breath) on exertion - Primary     With accelerating shortness of breath with exercise capacity decreasing from 10 miles a day down to 300 feet at a time for worse with incline, this is concerning for cardiac etiology. Therefore, stress test has been ordered. Pulmonary function test ordered and will be completed next week. Sample of Anoro given and is to wait to use this until after completion of pulmonary function test.  Of note, he has not responded to albuterol or Symbicort. Side effect hoarseness with the Symbicort. Relevant Orders    Stress test, myoview    Leg cramp     The patient has leg cramping and numbness of his right more than left leg. This is not reproducible with exertion and is greatest with sleeping and with squatting down. While this could be peripheral vascular disease, he has easily palpable dorsalis pedis and posterior tibial pulses. Therefore, I will start with a cardiac stress test first and then consider peripheral vascular disease work-up next. Given the easily detected pulses in the lower extremities, MRI may be more beneficial.           Other Visit Diagnoses     Other specified symptoms and signs involving the circulatory and respiratory systems                   If you have questions, please do not hesitate to call me. I look forward to following Gulshan Foster along with you.     Sincerely,        Elli Muller MD    CC providers:  Abdirizak Crump MD  [address] [place] [address] Dr Caryn Rosen [phone] VIA Outside Provider Messaging

## 2020-12-14 ENCOUNTER — TELEPHONE (OUTPATIENT)
Dept: PULMONOLOGY | Age: 80
End: 2020-12-14

## 2020-12-14 NOTE — TELEPHONE ENCOUNTER
Left Message for patient to call back. His COVID test results came back positive and he needs to be informed of this. He will have to reschedule his PFT.

## 2020-12-14 NOTE — TELEPHONE ENCOUNTER
Phone call from patient regarding his positive COVID test.  He expressed understanding and will quarantine for 14 days and he was transferred to cancel his PFT

## 2020-12-22 ENCOUNTER — APPOINTMENT (OUTPATIENT)
Dept: NON INVASIVE DIAGNOSTICS | Age: 80
End: 2020-12-22
Payer: MEDICARE

## 2020-12-24 ENCOUNTER — OFFICE VISIT (OUTPATIENT)
Dept: PRIMARY CARE CLINIC | Age: 80
End: 2020-12-24
Payer: MEDICARE

## 2020-12-24 PROCEDURE — 99211 OFF/OP EST MAY X REQ PHY/QHP: CPT | Performed by: NURSE PRACTITIONER

## 2020-12-24 NOTE — PROGRESS NOTES
Nichole Tinsley received a viral test for COVID-19. They were educated on isolation and quarantine as appropriate. For any symptoms, they were directed to seek care from their PCP, given contact information to establish with a doctor, directed to an urgent care or the emergency room.

## 2020-12-25 LAB — SARS-COV-2, NAA: NOT DETECTED

## 2020-12-30 ENCOUNTER — HOSPITAL ENCOUNTER (OUTPATIENT)
Dept: NON INVASIVE DIAGNOSTICS | Age: 80
Discharge: HOME | End: 2020-12-30
Payer: MEDICARE

## 2020-12-30 LAB
LV EF: 65 %
LVEF MODALITY: NORMAL

## 2020-12-30 PROCEDURE — 93017 CV STRESS TEST TRACING ONLY: CPT

## 2020-12-30 PROCEDURE — 3430000000 HC RX DIAGNOSTIC RADIOPHARMACEUTICAL: Performed by: INTERNAL MEDICINE

## 2020-12-30 PROCEDURE — 6360000002 HC RX W HCPCS: Performed by: INTERNAL MEDICINE

## 2020-12-30 PROCEDURE — 78452 HT MUSCLE IMAGE SPECT MULT: CPT

## 2020-12-30 PROCEDURE — A9502 TC99M TETROFOSMIN: HCPCS | Performed by: INTERNAL MEDICINE

## 2020-12-30 RX ADMIN — TETROFOSMIN 10 MILLICURIE: 1.38 INJECTION, POWDER, LYOPHILIZED, FOR SOLUTION INTRAVENOUS at 10:50

## 2020-12-30 RX ADMIN — TETROFOSMIN 30 MILLICURIE: 1.38 INJECTION, POWDER, LYOPHILIZED, FOR SOLUTION INTRAVENOUS at 12:25

## 2020-12-30 RX ADMIN — REGADENOSON 0.4 MG: 0.08 INJECTION, SOLUTION INTRAVENOUS at 11:56

## 2021-01-06 ENCOUNTER — OFFICE VISIT (OUTPATIENT)
Dept: PRIMARY CARE CLINIC | Age: 81
End: 2021-01-06
Payer: MEDICARE

## 2021-01-06 DIAGNOSIS — Z01.812 PRE-PROCEDURAL LABORATORY EXAMINATIONS: Primary | ICD-10-CM

## 2021-01-06 PROCEDURE — 99211 OFF/OP EST MAY X REQ PHY/QHP: CPT | Performed by: NURSE PRACTITIONER

## 2021-01-07 LAB — SARS-COV-2: NOT DETECTED

## 2021-01-12 ENCOUNTER — HOSPITAL ENCOUNTER (OUTPATIENT)
Dept: PULMONOLOGY | Age: 81
Discharge: HOME | End: 2021-01-12
Payer: MEDICARE

## 2021-01-12 LAB
DLCO %PRED: 53 %
DLCO PRED: NORMAL
DLCO/VA %PRED: NORMAL
DLCO/VA PRED: NORMAL
DLCO/VA: NORMAL
DLCO: NORMAL
EXPIRATORY TIME-POST: NORMAL
EXPIRATORY TIME: NORMAL
FEF 25-75% %CHNG: NORMAL
FEF 25-75% %PRED-POST: NORMAL
FEF 25-75% %PRED-PRE: NORMAL
FEF 25-75% PRED: NORMAL
FEF 25-75%-POST: NORMAL
FEF 25-75%-PRE: NORMAL
FEV1 %PRED-POST: 75 %
FEV1 %PRED-PRE: 69 %
FEV1 PRED: NORMAL
FEV1-POST: NORMAL
FEV1-PRE: NORMAL
FEV1/FVC %PRED-POST: NORMAL
FEV1/FVC %PRED-PRE: NORMAL
FEV1/FVC PRED: NORMAL
FEV1/FVC-POST: 68 %
FEV1/FVC-PRE: 70 %
FVC %PRED-POST: NORMAL
FVC %PRED-PRE: NORMAL
FVC PRED: NORMAL
FVC-POST: NORMAL
FVC-PRE: NORMAL
GAW %PRED: NORMAL
GAW PRED: NORMAL
GAW: NORMAL
IC %PRED: NORMAL
IC PRED: NORMAL
IC: NORMAL
MEP: NORMAL
MIP: NORMAL
MVV %PRED-PRE: NORMAL
MVV PRED: NORMAL
MVV-PRE: NORMAL
PEF %PRED-POST: NORMAL
PEF %PRED-PRE: NORMAL
PEF PRED: NORMAL
PEF%CHNG: NORMAL
PEF-POST: NORMAL
PEF-PRE: NORMAL
RAW %PRED: NORMAL
RAW PRED: NORMAL
RAW: NORMAL
RV %PRED: NORMAL
RV PRED: NORMAL
RV: NORMAL
SVC %PRED: NORMAL
SVC PRED: NORMAL
SVC: NORMAL
TLC %PRED: 102 %
TLC PRED: NORMAL
TLC: NORMAL
VA %PRED: NORMAL
VA PRED: NORMAL
VA: NORMAL
VTG %PRED: NORMAL
VTG PRED: NORMAL
VTG: NORMAL

## 2021-01-12 PROCEDURE — 94726 PLETHYSMOGRAPHY LUNG VOLUMES: CPT

## 2021-01-12 PROCEDURE — 94729 DIFFUSING CAPACITY: CPT

## 2021-01-12 PROCEDURE — 94060 EVALUATION OF WHEEZING: CPT | Performed by: INTERNAL MEDICINE

## 2021-01-12 PROCEDURE — 94640 AIRWAY INHALATION TREATMENT: CPT

## 2021-01-12 PROCEDURE — 94729 DIFFUSING CAPACITY: CPT | Performed by: INTERNAL MEDICINE

## 2021-01-12 PROCEDURE — 94060 EVALUATION OF WHEEZING: CPT

## 2021-01-12 PROCEDURE — 6370000000 HC RX 637 (ALT 250 FOR IP): Performed by: INTERNAL MEDICINE

## 2021-01-12 PROCEDURE — 94726 PLETHYSMOGRAPHY LUNG VOLUMES: CPT | Performed by: INTERNAL MEDICINE

## 2021-01-12 RX ORDER — ALBUTEROL SULFATE 90 UG/1
4 AEROSOL, METERED RESPIRATORY (INHALATION) ONCE
Status: COMPLETED | OUTPATIENT
Start: 2021-01-12 | End: 2021-01-12

## 2021-01-12 RX ADMIN — ALBUTEROL SULFATE 4 PUFF: 90 AEROSOL, METERED RESPIRATORY (INHALATION) at 09:41

## 2021-01-12 ASSESSMENT — PULMONARY FUNCTION TESTS
FEV1_PERCENT_PREDICTED_POST: 75
FEV1/FVC_POST: 68
FEV1/FVC_PRE: 70
FEV1_PERCENT_PREDICTED_PRE: 69

## 2021-01-12 NOTE — PROCEDURES
Spirometry was acceptable and reproducible by ATS standards    Spirometry  1. Spirometry shows moderate obstructive defect. Bronchodilator  1. There is no response to bronchodilator demonstrated. [Increase in FEV1 and/or FVC => 12% of control and => 200 ml]    Lung Volumes  2. Lung volumes are normal.    Diffusing Capacity  3. Diffusing capacity isdecreased. [>60% and <LLN]        Overall Interpretation  PFT does  demonstrates obstruction with FEV1 of 69 % FVC of 97%  without bronchodilator response with associated decrease in diffusion capacity. Air trapping is not present. Hyperinflation is not present. This consistent with Moderate COPD.   Clinical correlation needed     Pulmonary Function Testing Results:    FEV1 %Pred-Pre   Date Value Ref Range Status   01/12/2021 69 % Final     FEV1 %Pred-Post   Date Value Ref Range Status   01/12/2021 75 % Final     FEV1/FVC-Pre   Date Value Ref Range Status   01/12/2021 70 % Final     FEV1/FVC-Post   Date Value Ref Range Status   01/12/2021 68 % Final     TLC %Pred   Date Value Ref Range Status   01/12/2021 102 % Final     DLCO %Pred   Date Value Ref Range Status   01/12/2021 53 % Final             OBSTRUCTION % Predicted FEV1   MILD >70%   MODERATE 60-69%   MODERATELY-SEVERE 50-59%   SEVERE 35-49%   VERY SEVERE <35%         RESTRICTION % Predicted TLC   MILD Less than LLN but > than 70%   MODERATE 60-69%   MODERATELY-SEVERE <60%                 DIFFUSION CAPACITY DL,CO % Pred   MILD >60% AND < LLN   MODERATE 40-60%   SEVERE <40%       PFT data will be scanned into the procedure tab in epic under this encounter    Augustus Cheadle, MD  [facility]

## 2021-02-28 NOTE — PROGRESS NOTES
REASON FOR CONSULTATION/CC:    Chief Complaint   Patient presents with    Shortness of Breath     f/u SOB         PCP: Lynnette Pacheco MD    HISTORY OF PRESENT ILLNESS: Daniel Collins is a 68y.o. year old male with a history of  Tobacco abuse who presents          SOB  He was complainting of escalating dyspnea on exertion which increased to from 10 mile capacity to 300 feet. Stress test was negative with pulmonary function tests FEV1 69% and DLCO 53%. Of interest, 6 min walk test was completed with desaturation to 93% at [place] and he was able to walk 420 meters = 1377 feet or 79% of normal for age which is to significant contrast to history given. He states that he was not able to cut firewood this fall. He was found to be positive for COVID. He has now improved. After the testing, pulmonary function tests and stress, he was not improved. Has albuterol but has not used it. Positive for COVID Dec 11 and negaitve dec 24th. Leg cramps                PAST MEDICAL HISTORY:  Past Medical History:   Diagnosis Date    Basal cell carcinoma 6/17/2010    BPH (benign prostatic hyperplasia)     COPD (chronic obstructive pulmonary disease) (HCC)     Detached retina     left eye    GERD (gastroesophageal reflux disease)     Hypothyroidism        PAST SURGICAL HISTORY:  Past Surgical History:   Procedure Laterality Date    COLONOSCOPY  2009    polyps.  COLONOSCOPY  05/2018        EYE SURGERY  5/11    left detached retina    HEMORRHOID SURGERY  1990's       FAMILY HISTORY:  family history includes Cancer in his father and mother; Cancer (age of onset: 61) in his brother. No heart disease. SOCIAL HISTORY:   reports that he quit smoking about 31 years ago. His smoking use included cigarettes. He has a 50.00 pack-year smoking history. He has never used smokeless tobacco.      ALLERGIES:  Patient has No Known Allergies.        Objective:   PHYSICAL EXAM:  Blood pressure 134/68, pulse 78, temperature 97.8 °F (36.6 °C), temperature source Infrared, resp. rate 16, height 6' (1.829 m), weight 182 lb 3.2 oz (82.6 kg), SpO2 96 %.'  Body mass index is 24.71 kg/m². Gen: No distress. Eyes:    ENT:    Neck:    Resp: No accessory muscle use. No crackles. No wheezes. No rhonchi. CV: Regular rate. Regular rhythm. No murmur or rub. No edema. GI:    Skin:    Lymph:    M/S: No cyanosis. No clubbing. Neuro: Moves all four extremities. Psych: Oriented x 3. No anxiety. Awake. Alert. Intact judgement and insight. Current Outpatient Medications   Medication Sig Dispense Refill    levothyroxine (SYNTHROID) 75 MCG tablet TAKE ONE TABLET BY MOUTH DAILY 90 tablet 0    tamsulosin (FLOMAX) 0.4 MG capsule TAKE ONE CAPSULE BY MOUTH DAILY 90 capsule 0    loratadine-pseudoephedrine (CLARITIN-D 24 HOUR)  MG per extended release tablet Take 1 tablet by mouth daily 30 tablet 0    Fiber CAPS Take  by mouth daily.  fish oil-omega-3 fatty acids 1000 MG capsule Take 2 g by mouth daily. No current facility-administered medications for this visit. Data Reviewed:      Category 1 Data points:    Positive COVID-19 December 11. Negative COVID-19 December 24 in January 2021. Notes Reviewed:    Normal stress test  6-minute walk test with desaturation to 93% and able to walk 1377 m, 79% of predicted.   Pulmonary function test with FEV1 of 69% DLCO 53% without significant bronchodilator response     Total labs reviewed 3+    Category 2 Data points:    Radiology Review:  Independent interpretation of      Category 3 Data points:    Discussed management or interpretation of test with external provider:       Assessment:     · Tobacco abuse  · Shortness of breath  · Exertional desaturation to 93 %, December 2020  · Calcified pulmonary nodule unchanged [id number]      Plan:      Problem List Items Addressed This Visit     SOB (shortness of breath) on exertion - Primary    COPD with asthma ([facility] [address] No change in symptoms with Anoro. Will give trial of Breo. He has significant shortness of breath limited to 300 feet. This is now getting better. Of note, he tested positive for COVID-19 December 11. Medications did not improve his symptoms. Negative stress test.  6-minute walk test demonstrated 79% of normal capacity with desaturation to 93%. Therefore, there is currently no medical explanation for his worsening. Since he is improving and test positive for COVID-19, he is currently blaming this illness for his shortness of breath. While advised him this timing did not explain everything, and since he is improving, no further work-up at this time. Trial of Breo was given to see if we can improve him further. He will use his albuterol as needed. He expressed understanding for this. This note was transcribed using [id number]. Please disregard any translational errors. This note was transcribed using [id number]. Please disregard any translational errors.     [facility] [address] [phone]

## 2021-03-01 ENCOUNTER — OFFICE VISIT (OUTPATIENT)
Dept: PULMONOLOGY | Age: 81
End: 2021-03-01
Payer: MEDICARE

## 2021-03-01 VITALS
WEIGHT: 182.2 LBS | BODY MASS INDEX: 24.68 KG/M2 | TEMPERATURE: 97.8 F | RESPIRATION RATE: 16 BRPM | HEART RATE: 78 BPM | DIASTOLIC BLOOD PRESSURE: 68 MMHG | SYSTOLIC BLOOD PRESSURE: 134 MMHG | OXYGEN SATURATION: 96 % | HEIGHT: 72 IN

## 2021-03-01 DIAGNOSIS — J44.9 COPD WITH ASTHMA (HCC): ICD-10-CM

## 2021-03-01 DIAGNOSIS — R06.02 SOB (SHORTNESS OF BREATH) ON EXERTION: Primary | ICD-10-CM

## 2021-03-01 PROCEDURE — 99214 OFFICE O/P EST MOD 30 MIN: CPT | Performed by: INTERNAL MEDICINE

## 2021-03-01 RX ORDER — FLUTICASONE FUROATE AND VILANTEROL 100; 25 UG/1; UG/1
1 POWDER RESPIRATORY (INHALATION) DAILY
Qty: 1 EACH | Refills: 0 | COMMUNITY
Start: 2021-03-01 | End: 2021-08-19 | Stop reason: CLARIF

## 2021-03-01 NOTE — ASSESSMENT & PLAN NOTE
No change in symptoms with Anoro. Will give trial of Breo. He has significant shortness of breath limited to 300 feet. This is now getting better. Of note, he tested positive for COVID-19 December 11. Medications did not improve his symptoms. Negative stress test.  6-minute walk test demonstrated 79% of normal capacity with desaturation to 93%. Therefore, there is currently no medical explanation for his worsening. Since he is improving and test positive for COVID-19, he is currently blaming this illness for his shortness of breath. While advised him this timing did not explain everything, and since he is improving, no further work-up at this time. Trial of Breo was given to see if we can improve him further. He will use his albuterol as needed. He expressed understanding for this. This note was transcribed using [id number] [address]. Please disregard any translational errors.

## 2021-09-13 ENCOUNTER — OFFICE VISIT (OUTPATIENT)
Dept: PULMONOLOGY | Age: 81
End: 2021-09-13
Payer: MEDICARE

## 2021-09-13 VITALS
BODY MASS INDEX: 23.84 KG/M2 | HEIGHT: 72 IN | RESPIRATION RATE: 16 BRPM | WEIGHT: 176 LBS | SYSTOLIC BLOOD PRESSURE: 140 MMHG | DIASTOLIC BLOOD PRESSURE: 72 MMHG | TEMPERATURE: 97.8 F | HEART RATE: 76 BPM | OXYGEN SATURATION: 94 %

## 2021-09-13 DIAGNOSIS — J44.9 COPD WITH ASTHMA (HCC): Primary | ICD-10-CM

## 2021-09-13 DIAGNOSIS — J84.10 POSTINFLAMMATORY PULMONARY FIBROSIS (HCC): ICD-10-CM

## 2021-09-13 PROCEDURE — 99214 OFFICE O/P EST MOD 30 MIN: CPT | Performed by: INTERNAL MEDICINE

## 2021-09-13 RX ORDER — CHOLECALCIFEROL (VITAMIN D3) 125 MCG
500 CAPSULE ORAL DAILY
COMMUNITY

## 2021-09-13 RX ORDER — FLUTICASONE FUROATE, UMECLIDINIUM BROMIDE AND VILANTEROL TRIFENATATE 100; 62.5; 25 UG/1; UG/1; UG/1
1 POWDER RESPIRATORY (INHALATION) DAILY
Qty: 1 EACH | Refills: 0 | COMMUNITY
Start: 2021-09-13 | End: 2021-10-01 | Stop reason: SDUPTHER

## 2021-09-13 RX ORDER — M-VIT,TX,IRON,MINS/CALC/FOLIC 27MG-0.4MG
1 TABLET ORAL DAILY
COMMUNITY

## 2021-09-13 ASSESSMENT — ASTHMA QUESTIONNAIRES
QUESTION_2 LAST FOUR WEEKS HOW OFTEN HAVE YOU HAD SHORTNESS OF BREATH: 2
ACT_TOTALSCORE: 10
QUESTION_3 LAST FOUR WEEKS HOW OFTEN DID YOUR ASTHMA SYMPTOMS (WHEEZING, COUGHING, SHORTNESS OF BREATH, CHEST TIGHTNESS OR PAIN) WAKE YOU UP AT NIGHT OR EARLIER THAN USUAL IN THE MORNING: 2
QUESTION_4 LAST FOUR WEEKS HOW OFTEN HAVE YOU USED YOUR RESCUE INHALER OR NEBULIZER MEDICATION (SUCH AS ALBUTEROL): 2
QUESTION_5 LAST FOUR WEEKS HOW WOULD YOU RATE YOUR ASTHMA CONTROL: 2
QUESTION_1 LAST FOUR WEEKS HOW MUCH OF THE TIME DID YOUR ASTHMA KEEP YOU FROM GETTING AS MUCH DONE AT WORK, SCHOOL OR AT HOME: 2

## 2021-09-13 NOTE — PROGRESS NOTES
management or interpretation of test with external provider:        Assessment:     · Tobacco abuse  · Shortness of breath  · Exertional desaturation to 93 %, December 2020  · Calcified pulmonary nodule unchanged [id number]      Plan:      Problem List Items Addressed This Visit     [facility] ([facility]     Chest x-ray completed [facility] demonstrates pulmonary fibrosis left lower lobe. This is likely secondary to effects of COVID-19. This is likely significant contributing to his shortness of breath with exertion. He expressed understanding         Relevant Medications    fluticasone-umeclidin-vilant (TRELEGY ELLIPTA) 100-62.5-25 MCG/INH AEPB    COPD with asthma ([facility] - Primary     He has not responded to Symbicort, Anoro, Breo, albuterol as needed. 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 Given his lack of response to above medications, is not likely that he will respond to any medications. Sample of Trelegy has been given. Daily exercise encouraged. Expressed understanding for this. This note was transcribed using [id number]. Please disregard any translational errors. Relevant Medications    fluticasone-umeclidin-vilant (Chikis Wallagrass) 100-62.5-25 MCG/INH AEPB                This note was transcribed using [id number]. Please disregard any translational errors.     Betty Muñoz Pulmonary, Sleep and [facility] [phone]

## 2021-09-13 NOTE — ASSESSMENT & PLAN NOTE
He has not responded to Symbicort, Anoro, Breo, albuterol as needed. 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 Given his lack of response to above medications, is not likely that he will respond to any medications. Sample of Trelegy has been given. Daily exercise encouraged. Expressed understanding for this. This note was transcribed using [id number] [email or web address]. Please disregard any translational errors.

## 2021-09-16 PROBLEM — D46.9 MDS (MYELODYSPLASTIC SYNDROME) (HCC): Status: ACTIVE | Noted: 2021-09-16

## 2022-03-21 ENCOUNTER — HOSPITAL ENCOUNTER (OUTPATIENT)
Dept: GENERAL RADIOLOGY | Age: 82
Discharge: HOME | End: 2022-03-21
Payer: MEDICARE

## 2022-03-21 ENCOUNTER — HOSPITAL ENCOUNTER (OUTPATIENT)
Age: 82
Discharge: HOME | End: 2022-03-21
Payer: MEDICARE

## 2022-03-21 ENCOUNTER — OFFICE VISIT (OUTPATIENT)
Dept: PULMONOLOGY | Age: 82
End: 2022-03-21
Payer: MEDICARE

## 2022-03-21 VITALS
DIASTOLIC BLOOD PRESSURE: 78 MMHG | BODY MASS INDEX: 29.51 KG/M2 | WEIGHT: 188 LBS | OXYGEN SATURATION: 97 % | SYSTOLIC BLOOD PRESSURE: 158 MMHG | HEIGHT: 67 IN | TEMPERATURE: 98 F | HEART RATE: 78 BPM | RESPIRATION RATE: 18 BRPM

## 2022-03-21 DIAGNOSIS — J84.10 POSTINFLAMMATORY PULMONARY FIBROSIS (HCC): ICD-10-CM

## 2022-03-21 DIAGNOSIS — R06.02 SOB (SHORTNESS OF BREATH): ICD-10-CM

## 2022-03-21 DIAGNOSIS — R06.02 SOB (SHORTNESS OF BREATH): Primary | ICD-10-CM

## 2022-03-21 PROCEDURE — 99213 OFFICE O/P EST LOW 20 MIN: CPT | Performed by: INTERNAL MEDICINE

## 2022-03-21 PROCEDURE — 71046 X-RAY EXAM CHEST 2 VIEWS: CPT

## 2022-03-21 ASSESSMENT — COPD QUESTIONNAIRES
CAT_TOTALSCORE: 29
QUESTION6_LEAVINGHOUSE: 2
QUESTION8_ENERGYLEVEL: 4
QUESTION7_SLEEPQUALITY: 4
QUESTION4_WALKINCLINE: 5
QUESTION1_COUGHFREQUENCY: 3
QUESTION2_CHESTPHLEGM: 3
QUESTION3_CHESTTIGHTNESS: 4
QUESTION5_HOMEACTIVITIES: 4

## 2022-03-21 NOTE — PROGRESS NOTES
REASON FOR CONSULTATION/CC:    Chief Complaint   Patient presents with    COPD    Asthma         PCP: Mika Agudelo MD    HISTORY OF PRESENT ILLNESS: Shireen Sevilla is a 80y.o. year old male with a history of  Tobacco abuse who presents          History    Stress test was negative with pulmonary function tests FEV1 69% and DLCO 53%. 6 min walk test was completed with desaturation to 93% at [place] and he was able to walk 420 meters = 1377 feet or 79% of normal for age which is to significant contrast to history given. Positive for COVID Dec 11 and negaitve dec 24th. Chest x-ray June 2021 demonstrating fibrosis          Current history  shortness of breath /copd/ pulmonary fibrosis secondary to covid      Took Trelegy on a trial and thinks it is helping but he is not completely convienced. dyspnea on exertion is significant. Had covid twice with continued shortness of breath. When completing cardiac test and tested positive. States all cardiac testing was negative. Did not have decreased o2 on 6 mwt. Last chest X-ray / chest imaging was ~ June 2021 with scaring. Objective:   PHYSICAL EXAM:  Blood pressure (!) 158/78, pulse 78, temperature 98 °F (36.7 °C), temperature source Temporal, resp. rate 18, height 5' 7\" (1.702 m), weight 188 lb (85.3 kg), SpO2 97 %.'  Body mass index is 29.44 kg/m². Gen: No distress. Eyes:    ENT:    Neck:    Resp: No accessory muscle use. No crackles. No wheezes. No rhonchi. CV: Regular rate. Regular rhythm. No murmur or rub. No edema. GI:    Skin:    Lymph:    M/S: No cyanosis. No clubbing. Neuro: Moves all four extremities. Psych: Oriented x 3. No anxiety. Awake. Alert. Intact judgement and insight.           Data Reviewed:          Assessment:     · Tobacco abuse  · Shortness of breath  · Exertional desaturation to 93 %, December 2020  · Calcified pulmonary nodule unchanged [id number]      Plan:      Problem List Items Addressed This Visit     Postinflammatory pulmonary fibrosis [facility])      Last chest x-ray June 2021 demonstrates hyperinflation from COPD and pulmonary fibrosis secondary to Covid. He has tried [employer] and now on Trelegy. This may be improving his symptoms but still has significant impairment shortness of breath and dyspnea exertion. We discussed long-term impacts of COVID with pulmonary fibrosis and impaired oxygen uptake leading to symptoms of shortness of breath. With the stated, he was encouraged to continue exercise and improve endurance. Expressed understanding. Other Visit Diagnoses     SOB (shortness of breath)    -  Primary    Relevant Orders    XR CHEST (2 VW)                This note was transcribed using [id number] [employer]. Please disregard any translational errors.     Betty Muñoz Pulmonary, Sleep and [facility] [phone]

## 2022-03-21 NOTE — ASSESSMENT & PLAN NOTE
Last chest x-ray June 2021 demonstrates hyperinflation from COPD and pulmonary fibrosis secondary to Covid. He has tried [employer] and now on Trelegy. This may be improving his symptoms but still has significant impairment shortness of breath and dyspnea exertion. We discussed long-term impacts of COVID with pulmonary fibrosis and impaired oxygen uptake leading to symptoms of shortness of breath. With the stated, he was encouraged to continue exercise and improve endurance. Expressed understanding.

## 2022-05-07 DIAGNOSIS — J44.9 COPD WITH ASTHMA (HCC): ICD-10-CM

## 2022-05-07 DIAGNOSIS — J84.10 POSTINFLAMMATORY PULMONARY FIBROSIS (HCC): ICD-10-CM

## 2022-05-09 RX ORDER — FLUTICASONE FUROATE, UMECLIDINIUM BROMIDE AND VILANTEROL TRIFENATATE 100; 62.5; 25 UG/1; UG/1; UG/1
POWDER RESPIRATORY (INHALATION)
Qty: 60 EACH | Refills: 5 | Status: SHIPPED | OUTPATIENT
Start: 2022-05-09

## 2022-08-22 ENCOUNTER — HOSPITAL ENCOUNTER (OUTPATIENT)
Age: 82
Discharge: HOME | End: 2022-08-22
Payer: MEDICARE

## 2022-08-22 ENCOUNTER — HOSPITAL ENCOUNTER (OUTPATIENT)
Dept: GENERAL RADIOLOGY | Age: 82
Discharge: HOME | End: 2022-08-22
Payer: MEDICARE

## 2022-08-22 DIAGNOSIS — M25.562 ACUTE PAIN OF BOTH KNEES: ICD-10-CM

## 2022-08-22 DIAGNOSIS — M25.561 ACUTE PAIN OF BOTH KNEES: ICD-10-CM

## 2022-08-22 PROCEDURE — 73560 X-RAY EXAM OF KNEE 1 OR 2: CPT

## 2022-08-29 ENCOUNTER — HOSPITAL ENCOUNTER (OUTPATIENT)
Dept: WOUND CARE | Age: 82
Discharge: HOME | End: 2022-08-29
Payer: MEDICARE

## 2022-08-29 VITALS
HEIGHT: 71 IN | RESPIRATION RATE: 18 BRPM | WEIGHT: 185 LBS | TEMPERATURE: 97.4 F | DIASTOLIC BLOOD PRESSURE: 77 MMHG | HEART RATE: 84 BPM | BODY MASS INDEX: 25.9 KG/M2 | SYSTOLIC BLOOD PRESSURE: 152 MMHG

## 2022-08-29 DIAGNOSIS — S31.109A: Primary | ICD-10-CM

## 2022-08-29 DIAGNOSIS — Z87.2 HX OF ABSCESS OF SKIN AND SUBCUTANEOUS TISSUE: ICD-10-CM

## 2022-08-29 PROCEDURE — 99203 OFFICE O/P NEW LOW 30 MIN: CPT

## 2022-08-29 PROCEDURE — 97597 DBRDMT OPN WND 1ST 20 CM/<: CPT

## 2022-08-29 RX ORDER — LIDOCAINE 40 MG/G
CREAM TOPICAL ONCE
Status: CANCELLED | OUTPATIENT
Start: 2022-08-29 | End: 2022-08-29

## 2022-08-29 RX ORDER — LIDOCAINE 50 MG/G
OINTMENT TOPICAL ONCE
OUTPATIENT
Start: 2022-08-29 | End: 2022-08-29

## 2022-08-29 RX ORDER — LIDOCAINE 50 MG/G
OINTMENT TOPICAL ONCE
Status: CANCELLED | OUTPATIENT
Start: 2022-08-29 | End: 2022-08-29

## 2022-08-29 RX ORDER — GENTAMICIN SULFATE 1 MG/G
OINTMENT TOPICAL ONCE
OUTPATIENT
Start: 2022-08-29 | End: 2022-08-29

## 2022-08-29 RX ORDER — LIDOCAINE HYDROCHLORIDE 40 MG/ML
SOLUTION TOPICAL ONCE
Status: COMPLETED | OUTPATIENT
Start: 2022-08-29 | End: 2022-08-29

## 2022-08-29 RX ORDER — CLOBETASOL PROPIONATE 0.5 MG/G
OINTMENT TOPICAL ONCE
OUTPATIENT
Start: 2022-08-29 | End: 2022-08-29

## 2022-08-29 RX ORDER — BACITRACIN ZINC AND POLYMYXIN B SULFATE 500; 1000 [USP'U]/G; [USP'U]/G
OINTMENT TOPICAL ONCE
Status: CANCELLED | OUTPATIENT
Start: 2022-08-29 | End: 2022-08-29

## 2022-08-29 RX ORDER — LIDOCAINE HYDROCHLORIDE 40 MG/ML
SOLUTION TOPICAL ONCE
OUTPATIENT
Start: 2022-08-29 | End: 2022-08-29

## 2022-08-29 RX ORDER — BACITRACIN, NEOMYCIN, POLYMYXIN B 400; 3.5; 5 [USP'U]/G; MG/G; [USP'U]/G
OINTMENT TOPICAL ONCE
Status: CANCELLED | OUTPATIENT
Start: 2022-08-29 | End: 2022-08-29

## 2022-08-29 RX ORDER — LIDOCAINE HYDROCHLORIDE 20 MG/ML
JELLY TOPICAL ONCE
OUTPATIENT
Start: 2022-08-29 | End: 2022-08-29

## 2022-08-29 RX ORDER — GINSENG 100 MG
CAPSULE ORAL ONCE
Status: CANCELLED | OUTPATIENT
Start: 2022-08-29 | End: 2022-08-29

## 2022-08-29 RX ORDER — BACITRACIN, NEOMYCIN, POLYMYXIN B 400; 3.5; 5 [USP'U]/G; MG/G; [USP'U]/G
OINTMENT TOPICAL ONCE
OUTPATIENT
Start: 2022-08-29 | End: 2022-08-29

## 2022-08-29 RX ORDER — BETAMETHASONE DIPROPIONATE 0.05 %
OINTMENT (GRAM) TOPICAL ONCE
Status: CANCELLED | OUTPATIENT
Start: 2022-08-29 | End: 2022-08-29

## 2022-08-29 RX ORDER — GENTAMICIN SULFATE 1 MG/G
OINTMENT TOPICAL ONCE
Status: CANCELLED | OUTPATIENT
Start: 2022-08-29 | End: 2022-08-29

## 2022-08-29 RX ORDER — LIDOCAINE HYDROCHLORIDE 20 MG/ML
JELLY TOPICAL ONCE
Status: CANCELLED | OUTPATIENT
Start: 2022-08-29 | End: 2022-08-29

## 2022-08-29 RX ORDER — BACITRACIN ZINC AND POLYMYXIN B SULFATE 500; 1000 [USP'U]/G; [USP'U]/G
OINTMENT TOPICAL ONCE
OUTPATIENT
Start: 2022-08-29 | End: 2022-08-29

## 2022-08-29 RX ORDER — BETAMETHASONE DIPROPIONATE 0.05 %
OINTMENT (GRAM) TOPICAL ONCE
OUTPATIENT
Start: 2022-08-29 | End: 2022-08-29

## 2022-08-29 RX ORDER — CLOBETASOL PROPIONATE 0.5 MG/G
OINTMENT TOPICAL ONCE
Status: CANCELLED | OUTPATIENT
Start: 2022-08-29 | End: 2022-08-29

## 2022-08-29 RX ORDER — GINSENG 100 MG
CAPSULE ORAL ONCE
OUTPATIENT
Start: 2022-08-29 | End: 2022-08-29

## 2022-08-29 RX ADMIN — LIDOCAINE HYDROCHLORIDE: 40 SOLUTION TOPICAL at 11:40

## 2022-08-29 ASSESSMENT — PAIN SCALES - GENERAL: PAINLEVEL_OUTOF10: 0

## 2022-08-29 NOTE — DISCHARGE INSTRUCTIONS
[address] [place] [address] Physician Orders and Discharge Instructions  [address] [place] [address] Drive, [address], [address]  Telephone: [phone]  [address] 8:30 am - 4:30 pm and Friday 8:30 am - 1:00 pm.        NAME:  Yari Morgan  YOB: 1940  MEDICAL RECORD NUMBER:  [id number]  TODAYS DATE:  8/29/22       Please wash hands with soap and water prior to and right after  every dressing change         WOUND LOCATION   RIGHT ABDOMINAL WOUND     May rinse wounds with 0.9% saline   Do NOT SCRUB WOUND. Keep wounds dry in the shower unless otherwise instructed by the physician. Topical Treatments:              [x] Apply around the wound:   [] moisturizing lotion  [] Antifungal ointment      [x] No-Sting barrier film   [] Zinc paste     PRIMARY DRESSING:            [x] Mupirocin      SECONDARY DRESSING:               [x] Gauze    2x2                                  []  MEPITEL- silicone pad              [x] Cover Roll Tape               [] BANDAID? HOW OFTEN TO CHANGE DRESSINGS:   [x] Daily   1-2 TIMES A DAY                      _____________________________________________________________________________________________    Dietary:  Continue your diet as tolerated. Eat heart-healthy foods. These foods include vegetables, fruits, nuts, beans, lean meat, fish, and whole grains. Limit sodium, alcohol, and sugar. Protein is a pratt nutrient in helping to repair damaged tissue and promote new tissue growth. Good sources of protein are milk, yogurt, cheese, fish, lean meat, and beans.   If you are a diabetic, having diabetes can make it hard for wounds to heal. So try to keep your blood sugar in its target range.  __________________________________________________________________________________________________    ACTIVITY:   Activity as tolerated  ________________________________________________________________________________________________________________    PAIN:    Please note, A small amount of pain, drainage and/or bleeding from this process might be expected, and is NORMAL. Elevate the affected limb. Use over-the-counter medications you would normally use for pain as permitted by your family doctor. For persistent pain not relieved by the above interventions, please call your family doctor.  ________________________________________________________________________________  Call your doctor now or seek immediate medical care if:    You have symptoms of infection, such as: Increased pain, swelling, warmth, or redness. Red streaks leading from the area. Pus draining from the area. A fever. _______________________________________________________________________    Return Appointment:  DME/Wound Dressing Supplies Provided by:   (Supply companies distribute one month supply at a time. Please call them directly to reorder supplies when you run out)     ECF or Home Healthcare: Your Home Care Agency is responsible to order your supplies. Return Appointment: With Jarred Rogers CNP  in  [address] [place] [address])                  [] Orders placed during your visit:          Electronically signed by : Coyd Vinson on 8/29/2022 at 10:05 AM       Tonja M[address]: Should you experience any significant changes in your wound(s) or have questions about your wound care, please contact the [address] [place] [address] at [address] 8:30 am - 4:30 pm and Friday 8:30 am - 1:00 pm.  If you need help with your wound outside these hours and cannot wait until we are again available, contact your PCP or go to the hospital emergency room. PLEASE NOTE: IF YOU ARE UNABLE TO OBTAIN WOUND SUPPLIES, CONTINUE TO USE THE SUPPLIES YOU HAVE AVAILABLE UNTIL YOU ARE ABLE TO REACH US. KEEP THE WOUND COVERED AT ALL TIMES. Physician Signature:_______________________    Date: ___________ Time:  ____________     [x] Peggy Ng CNP     [] Dr Brigitte Hernandez    [] Ivan Ro CNP

## 2022-08-29 NOTE — LETTER
[address]  [address]  [facility] [address]  [phone]  Dept: [phone]   TODAYS DATE: 8/22/2022    [address] [place] [address],4Th Floor Wound Care   Appointment Treatment Guidelines  Welcome Mr. Kemal Gaaln to the [address] [place] [address] Pkwy. We appreciate the confidence you have shown in choosing us as your wound care provider. Our goal is to heal your wound(s) as quickly as possible. Please read the items below regarding the nature of your appointments. 1. We will make every effort to schedule appointments that are convenient for you. Certain days and times may not be available, depending on your providers office hours and details of your care. 2. Patients will not necessarily be brought to an exam room in the order in which they arrive. Many providers work out of this office and patients are here for different procedures. Our goal is to serve you as quickly as possible. 3. We acknowledge that your time is valuable. Please remember that wound healing takes time and we appreciate your understanding that the length of each patients appointment will vary depending upon their need. 4. It is for your protection that we ask for insurance cards, photo ID, and new consent forms on your first visit and periodically throughout your treatment at all our facilities. 5. Wound Care treatment is known to be most effective when provided on a regular basis. Missed appointments, and not following the recommended plan of care can result in ineffective treatment and a poor outcome. If you find it difficult to keep appointments or to follow the recommended plan of care, it is your responsibility to let the staff know, so that we can work with you toward a solution. 6. If you need to miss an appointment, please call to let us know. We expect 24 hours notice for all cancellations.  We also expect missed visits to be rescheduled as soon as possible, preferably within the same week to promote the most effective healing time for your wound(s). 7. If you will be late for an appointment, please call our center to be sure that the provider can still see you when you arrive. If you are more than 15 minutes late your appointment may need to be rescheduled. 8. If two (2) appointments are missed without notifying us, your care plan may be discontinued. The same may happen if multiple visits are cancelled or rescheduled, even with notice. A missed visit is time when another patient, who also needs care, could have been seen. Thank you for your understanding and consideration.

## 2022-08-29 NOTE — LETTER
[address]  [address] [facility] [address]  [phone]  Dept: [phone]        Thank you Mr. Mehdi Gilbert for choosing [facility] for your Wound Care needs. We hope you found your first visit both encouraging and educational.  We look forward to serving you throughout the healing process. Before your next visit please review the information you received in your [employer]. The first visit can be overwhelming and this is a useful tool to refresh what information you may have been given. If you find yourself with any questions prior to your upcoming appointment, please feel free to contact us. Often wounds can be challenging and it is our goal to see you through the healing process with as much support possible. Again, thank you for choosing [facility],4[address] Floor!     Sincerely,      The Staff of [address] [place] [address] Pkwy and Hyperbaric Oxygen Therapy

## 2022-08-29 NOTE — PLAN OF CARE
Patient Name:  Philippe Marsh  YOB: 1940  Today's Date:  August 29, 2022  Medical Record Number:  [id number]  Provider:    [address] [place] [address] Treatment Guidelines        The [address] [place] [address] Appointment Treatment Guidelines were reviewed on August 29, 2022 with the patient. Mr. Terri Dean understanding of the [facility] Appointment Treatment Guidelines.       Electronically signed by Franko Linda RN on 8/29/22 at 10:39 AM EDT
Quality 130: Documentation Of Current Medications In The Medical Record: Current Medications Documented
Detail Level: Detailed

## 2022-08-29 NOTE — PROGRESS NOTES
[address]   Progress Note and Procedure Note      Tobias Peralta  MEDICAL RECORD NUMBER:  [id number]  AGE: 80 y.o. GENDER: male  : 1940  EPISODE DATE:  2022    Subjective:     Chief Complaint   Patient presents with    Wound Check     Initial right lateral abdomen wound         HISTORY of PRESENT ILLNESS HPI     Tobias Peralta is a 80 y.o. male who presents today for wound/ulcer evaluation. History of Wound Context: Abscess. Patient reports that wound started as an abscess that spontaneously erupted a large amount of purulence. Treatment included doxycycline and topical triple antibiotic ointment. He had a skin eruption from either the triple antibiotic ointment or Band-aids, but he believes it was the ointment. Since then, he has been applying Vaseline to the site, but it does not appear to be healing. Wound/Ulcer Pain Timing/Severity: none  Quality of pain: N/A  Severity:  0 / 10   Modifying Factors: None  Associated Signs/Symptoms: scant drainage    Ulcer Identification:  Ulcer Type:  started as an abscess    Contributing Factors: none    Acute Wound: N/A    PAST MEDICAL HISTORY        Diagnosis Date    Basal cell carcinoma 2010    BPH (benign prostatic hyperplasia)     COPD (chronic obstructive pulmonary disease) (HCC)     Detached retina     left eye    GERD (gastroesophageal reflux disease)     Hypothyroidism        PAST SURGICAL HISTORY    Past Surgical History:   Procedure Laterality Date    COLONOSCOPY      polyps.     COLONOSCOPY  2018        EYE SURGERY      left detached retina    HEMORRHOID SURGERY         FAMILY HISTORY    Family History   Problem Relation Age of Onset    Cancer Mother         ovarian    Cancer Father         lung    Cancer Brother 61        lung and throat cancr       SOCIAL HISTORY    Social History     Tobacco Use    Smoking status: Former     Packs/day: 1.00     Years: 50.00     Pack years: 50. 00     Types: Cigarettes     Quit date: 1990     Years since quittin.6    Smokeless tobacco: Never   Vaping Use    Vaping Use: Never used   Substance Use Topics    Alcohol use: No    Drug use: No       ALLERGIES    No Known Allergies    MEDICATIONS    Current Outpatient Medications on File Prior to Encounter   Medication Sig Dispense Refill    tamsulosin (FLOMAX) 0.4 MG capsule Take 2 capsules by mouth daily 180 capsule 3    levothyroxine (SYNTHROID) 75 MCG tablet TAKE ONE TABLET BY MOUTH DAILY 90 tablet 2    finasteride (PROSCAR) 5 MG tablet Take 1 tablet by mouth in the morning. 90 tablet 2    Budeson-Glycopyrrol-Formoterol (BREZTRI AEROSPHERE) 160-9-4.8 MCG/ACT AERO Inhale 2 puffs into the lungs in the morning and at bedtime 1 each 0    TRELEGY ELLIPTA 100-62.5-25 MCG/INH AEPB USE 1 INHALATION ORALLY    DAILY 60 each 5    pantoprazole (PROTONIX) 20 MG tablet Take 1 tablet by mouth every morning (before breakfast) 90 tablet 1    Multiple Vitamins-Minerals (THERAPEUTIC MULTIVITAMIN-MINERALS) tablet Take 1 tablet by mouth daily      vitamin B-12 (CYANOCOBALAMIN) 500 MCG tablet Take 500 mcg by mouth daily      albuterol sulfate  (90 Base) MCG/ACT inhaler Inhale 2 puffs into the lungs every 4 hours as needed       loratadine-pseudoephedrine (CLARITIN-D 24 HOUR)  MG per extended release tablet Take 1 tablet by mouth daily 30 tablet 0    Fiber CAPS Take  by mouth daily. No current facility-administered medications on file prior to encounter. REVIEW OF SYSTEMS    Pertinent items are noted in HPI.       Objective:      BP (!) 152/77   Pulse 84   Temp 97.4 °F (36.3 °C) (Temporal)   Resp 18   Ht 5' 11\" (1.803 m)   Wt 185 lb (83.9 kg)   BMI 25.80 kg/m²     Wt Readings from Last 3 Encounters:   22 185 lb (83.9 kg)   22 185 lb (83.9 kg)   08/15/22 183 lb (83 kg)       PHYSICAL EXAM    General Appearance: alert and oriented to person, place and time, well developed and well- nourished, in no acute distress  Skin: warm and dry; open wound on right lateral abdomen  Head: normocephalic and atraumatic  Eyes: pupils equal, round, and reactive to light, extraocular eye movements intact, conjunctivae normal  Neck: supple  Pulmonary/Chest: clear to auscultation bilaterally- no wheezes, rales or rhonchi, normal air movement, no respiratory distress  Cardiovascular: normal rate, regular rhythm, normal S1 and S2, no murmurs, rubs, clicks, or gallops, no carotid bruits  Abdomen: soft, non-tender, non-distended, normal bowel sounds  Musculoskeletal: normal range of motion, no joint swelling, deformity or tenderness  Neurologic: reflexes normal and symmetric, no cranial nerve deficit, gait, coordination and speech normal      Assessment:        Problem List Items Addressed This Visit       Open wound of abdominal wall, lateral, initial encounter - Primary    Relevant Orders    Initiate Outpatient Wound Care Protocol    Hx of abscess of skin and subcutaneous tissue    Relevant Orders    Initiate Outpatient Wound Care Protocol        Procedure Note  Indications:  Based on my examination of this patient's wound(s)/ulcer(s) today, debridement is required to promote healing and evaluate the wound base. Performed by: JORDYN Mackenzie - FLY    Consent obtained:  Yes    Time out taken:  Yes    Pain Control: Anesthetic  Anesthetic: 5% Lidocaine Ointment Topical       Debridement: Selective Debridement/Non-Excisional Debridement    Using curette the wound(s)/ulcer(s) was/were debrided down through and including the removal of epidermis and dermis.         Devitalized Tissue Debrided:  slough    Pre Debridement Measurements:  Are located in the Wound/Ulcer Documentation Flow Sheet    Diabetic/Pressure/Non Pressure Ulcers only:  Ulcer: Non-Pressure ulcer, limited to breakdown of skin     Wound/Ulcer #: 1    Post Debridement Measurements:  Wound/Ulcer Descriptions are Pre Debridement except measurements:    Wound 08/29/22 Abdomen Lateral;Right #1,noted (4/1/22), but drained on July 21, 22 (Active)   Wound Image   08/29/22 0928   Wound Etiology Other 08/29/22 0928   Dressing/Treatment Pharmaceutical agent (see MAR); Gauze dressing/dressing sponge;Tape/Soft cloth adhesive tape 08/29/22 1022   Wound Length (cm) 0.5 cm 08/29/22 0928   Wound Width (cm) 1 cm 08/29/22 0928   Wound Depth (cm) 0.2 cm 08/29/22 0928   Wound Surface Area (cm^2) 0.5 cm^2 08/29/22 0928   Wound Volume (cm^3) 0.1 cm^3 08/29/22 0928   Post-Procedure Length (cm) 0.6 cm 08/29/22 1003   Post-Procedure Width (cm) 1.1 cm 08/29/22 1003   Post-Procedure Depth (cm) 0.3 cm 08/29/22 1003   Post-Procedure Surface Area (cm^2) 0.66 cm^2 08/29/22 1003   Post-Procedure Volume (cm^3) 0.198 cm^3 08/29/22 1003   Wound Assessment Slough;Granulation tissue 08/29/22 0928   Drainage Amount Small 08/29/22 0928   Drainage Description Serosanguinous 08/29/22 0928   Odor None 08/29/22 0928   Yolanda-wound Assessment Dry/flaky 08/29/22 0928   Margins Undefined edges 08/29/22 0928   Wound Thickness Description not for Pressure Injury Full thickness 08/29/22 0928   Number of days: 0          Total Surface Area Debrided:  0.66 sq cm     Estimated Blood Loss:  Minimal    Hemostasis Achieved:  by pressure    Procedural Pain:  0  / 10     Post Procedural Pain:  0 / 10     Response to treatment:  Well tolerated by patient. Prescribed mupirocin ointment to help bring this wound to closure, followed by gauze and cover roll. Plan:     Treatment Note please see attached Discharge Instructions    Written patient dismissal instructions given to patient and signed by patient or POA.          Discharge [address] [facility] Physician Orders and Discharge Instructions  [address] [place] [address], [address] [place] [address]  Telephone: [phone]  [address] 8:30 am - 4:30 pm and Friday 8:30 am - 1:00 pm. NAME:  Jaz Caruso OF BIRTH:  1940  MEDICAL RECORD NUMBER:  [id number]  TODAYS DATE:  8/29/22       Please wash hands with soap and water prior to and right after  every dressing change         WOUND LOCATION   RIGHT ABDOMINAL WOUND     May rinse wounds with 0.9% saline   Do NOT SCRUB WOUND. Keep wounds dry in the shower unless otherwise instructed by the physician. Topical Treatments:              [x] Apply around the wound:   [] moisturizing lotion  [] Antifungal ointment      [x] No-Sting barrier film   [] Zinc paste     PRIMARY DRESSING:            [x] Mupirocin      SECONDARY DRESSING:               [x] Gauze    2x2                                  []  MEPITEL- silicone pad              [x] Cover Roll Tape               [] BANDAID? HOW OFTEN TO CHANGE DRESSINGS:   [x] Daily   1-2 TIMES A DAY                      _____________________________________________________________________________________________    Dietary:  Continue your diet as tolerated. Eat heart-healthy foods. These foods include vegetables, fruits, nuts, beans, lean meat, fish, and whole grains. Limit sodium, alcohol, and sugar. Protein is a pratt nutrient in helping to repair damaged tissue and promote new tissue growth. Good sources of protein are milk, yogurt, cheese, fish, lean meat, and beans. If you are a diabetic, having diabetes can make it hard for wounds to heal. So try to keep your blood sugar in its target range.  __________________________________________________________________________________________________    ACTIVITY:   Activity as tolerated  ________________________________________________________________________________________________________________    PAIN:    Please note, A small amount of pain, drainage and/or bleeding from this process might be expected, and is NORMAL. Elevate the affected limb.   Use over-the-counter medications you would normally use for pain as permitted by your family doctor. For persistent pain not relieved by the above interventions, please call your family doctor.  ________________________________________________________________________________  Call your doctor now or seek immediate medical care if:    You have symptoms of infection, such as: Increased pain, swelling, warmth, or redness. Red streaks leading from the area. Pus draining from the area. A fever. _______________________________________________________________________    Return Appointment:  DME/Wound Dressing Supplies Provided by:   (Supply companies distribute one month supply at a time. Please call them directly to reorder supplies when you run out)     ECF or Home Healthcare: Your Home Care Agency is responsible to order your supplies. Return Appointment: With Cyn Vences CNP  in  [address] [place] [address])                  [] Orders placed during your visit:          Electronically signed by : Natacha Ibanez on 8/29/2022 at 10:05 AM       [address]: Should you experience any significant changes in your wound(s) or have questions about your wound care, please contact the [address] [place] [address] at [address] 8:30 am - 4:30 pm and Friday 8:30 am - 1:00 pm.  If you need help with your wound outside these hours and cannot wait until we are again available, contact your PCP or go to the hospital emergency room. PLEASE NOTE: IF YOU ARE UNABLE TO OBTAIN WOUND SUPPLIES, CONTINUE TO USE THE SUPPLIES YOU HAVE AVAILABLE UNTIL YOU ARE ABLE TO REACH US. KEEP THE WOUND COVERED AT ALL TIMES.          Physician Signature:_______________________    Date: ___________ Time:  ____________     [x] Cyn Vences CNP     [] Dr Mars Mima    [] Deya Stein CNP                  Electronically signed by JORDYN Ruvalcaba CNP on 8/29/2022 at 4:12 PM

## 2022-08-31 NOTE — DISCHARGE INSTRUCTIONS
[address] [place] [address] Drive Physician Orders and Discharge Instructions  [facility], [address], [address]  Telephone: [phone]  [address] 8:30 am - 4:30 pm and Friday 8:30 am - 1:00 pm.          NAME:  Severiano Emperor Mayfield  YOB: 1940  MEDICAL RECORD NUMBER:  [id number]  TODAYS DATE:  9/12/22         Please wash hands with soap and water prior to and right after  every dressing change            WOUND LOCATION   RIGHT ABDOMINAL WOUND      May rinse wounds with 0.9% saline   Do NOT SCRUB WOUND. Keep wounds dry in the shower unless otherwise instructed by the physician. Topical Treatments:              [x] Apply around the wound:   [] moisturizing lotion  [] Antifungal ointment      [x] No-Sting barrier film   [] Zinc paste      PRIMARY DRESSING:              [x] Mupirocin        SECONDARY DRESSING:                [x] Gauze      2x2                                                   [x] Cover Roll Tape / paper tape                                                                                         HOW OFTEN TO CHANGE DRESSINGS:            [x] Daily   1-2 TIMES A DAY                                  _____________________________________________________________________________________________     Dietary:  Continue your diet as tolerated. Eat heart-healthy foods. These foods include vegetables, fruits, nuts, beans, lean meat, fish, and whole grains. Limit sodium, alcohol, and sugar. Protein is a pratt nutrient in helping to repair damaged tissue and promote new tissue growth. Good sources of protein are milk, yogurt, cheese, fish, lean meat, and beans.   If you are a diabetic, having diabetes can make it hard for wounds to heal. So try to keep your blood sugar in its target range.  __________________________________________________________________________________________________     ACTIVITY:   Activity as tolerated  ________________________________________________________________________________________________________________     PAIN:     Please note, A small amount of pain, drainage and/or bleeding from this process might be expected, and is NORMAL. Elevate the affected limb. Use over-the-counter medications you would normally use for pain as permitted by your family doctor. For persistent pain not relieved by the above interventions, please call your family doctor.  ________________________________________________________________________________  Call your doctor now or seek immediate medical care if:    You have symptoms of infection, such as: Increased pain, swelling, warmth, or redness. Red streaks leading from the area. Pus draining from the area. A fever. _______________________________________________________________________     Return Appointment:  DME/Wound Dressing Supplies Provided by:   (Supply companies distribute one month supply at a time. Please call them directly to reorder supplies when you run out)     ECF or Home Healthcare: Your Home Care Agency is responsible to order your supplies. Return Appointment: With Dasha Whaley CNP  in  [address] [place] [address])      may cancel if you are healed                 [] Orders placed during your visit:               Electronically signed by : Natasha Kruse. on 9/12/2022 at 10:06 AM        [address]: Should you experience any significant changes in your wound(s) or have questions about your wound care, please contact the [address] [place] [address] at [address] 8:30 am - 4:30 pm and Friday 8:30 am - 1:00 pm.  If you need help with your wound outside these hours and cannot wait until we are again available, contact your PCP or go to the hospital emergency room. PLEASE NOTE: IF YOU ARE UNABLE TO OBTAIN WOUND SUPPLIES, CONTINUE TO USE THE SUPPLIES YOU HAVE AVAILABLE UNTIL YOU ARE ABLE TO REACH US.  KEEP THE WOUND COVERED AT ALL TIMES.            Physician Signature:_______________________     Date: ___________ Time:  ____________               [x] Peggy Ng CNP     [] Dr Brigitte Hernandez    [] Ivan Ro CNP

## 2022-09-12 ENCOUNTER — HOSPITAL ENCOUNTER (OUTPATIENT)
Dept: WOUND CARE | Age: 82
Discharge: HOME | End: 2022-09-12
Payer: MEDICARE

## 2022-09-12 ENCOUNTER — APPOINTMENT (OUTPATIENT)
Dept: WOUND CARE | Age: 82
End: 2022-09-12
Payer: MEDICARE

## 2022-09-12 VITALS
HEART RATE: 79 BPM | DIASTOLIC BLOOD PRESSURE: 79 MMHG | RESPIRATION RATE: 18 BRPM | TEMPERATURE: 98.6 F | SYSTOLIC BLOOD PRESSURE: 159 MMHG

## 2022-09-12 DIAGNOSIS — S31.109A: Primary | ICD-10-CM

## 2022-09-12 DIAGNOSIS — Z87.2 HX OF ABSCESS OF SKIN AND SUBCUTANEOUS TISSUE: ICD-10-CM

## 2022-09-12 PROBLEM — S31.109D: Status: ACTIVE | Noted: 2022-08-29

## 2022-09-12 PROCEDURE — 97597 DBRDMT OPN WND 1ST 20 CM/<: CPT | Performed by: NURSE PRACTITIONER

## 2022-09-12 PROCEDURE — 97597 DBRDMT OPN WND 1ST 20 CM/<: CPT

## 2022-09-12 RX ORDER — LIDOCAINE 40 MG/G
CREAM TOPICAL ONCE
Status: COMPLETED | OUTPATIENT
Start: 2022-09-12 | End: 2022-09-12

## 2022-09-12 RX ORDER — BACITRACIN, NEOMYCIN, POLYMYXIN B 400; 3.5; 5 [USP'U]/G; MG/G; [USP'U]/G
OINTMENT TOPICAL ONCE
OUTPATIENT
Start: 2022-09-12 | End: 2022-09-12

## 2022-09-12 RX ORDER — LIDOCAINE 50 MG/G
OINTMENT TOPICAL ONCE
OUTPATIENT
Start: 2022-09-12 | End: 2022-09-12

## 2022-09-12 RX ORDER — BACITRACIN ZINC AND POLYMYXIN B SULFATE 500; 1000 [USP'U]/G; [USP'U]/G
OINTMENT TOPICAL ONCE
OUTPATIENT
Start: 2022-09-12 | End: 2022-09-12

## 2022-09-12 RX ORDER — LIDOCAINE HYDROCHLORIDE 20 MG/ML
JELLY TOPICAL ONCE
OUTPATIENT
Start: 2022-09-12 | End: 2022-09-12

## 2022-09-12 RX ORDER — LIDOCAINE HYDROCHLORIDE 40 MG/ML
SOLUTION TOPICAL ONCE
OUTPATIENT
Start: 2022-09-12 | End: 2022-09-12

## 2022-09-12 RX ORDER — GENTAMICIN SULFATE 1 MG/G
OINTMENT TOPICAL ONCE
OUTPATIENT
Start: 2022-09-12 | End: 2022-09-12

## 2022-09-12 RX ORDER — BETAMETHASONE DIPROPIONATE 0.05 %
OINTMENT (GRAM) TOPICAL ONCE
OUTPATIENT
Start: 2022-09-12 | End: 2022-09-12

## 2022-09-12 RX ORDER — LIDOCAINE 40 MG/G
CREAM TOPICAL ONCE
OUTPATIENT
Start: 2022-09-12 | End: 2022-09-12

## 2022-09-12 RX ORDER — GINSENG 100 MG
CAPSULE ORAL ONCE
OUTPATIENT
Start: 2022-09-12 | End: 2022-09-12

## 2022-09-12 RX ORDER — CLOBETASOL PROPIONATE 0.5 MG/G
OINTMENT TOPICAL ONCE
OUTPATIENT
Start: 2022-09-12 | End: 2022-09-12

## 2022-09-12 RX ADMIN — LIDOCAINE: 40 CREAM TOPICAL at 09:56

## 2022-09-12 ASSESSMENT — PAIN SCALES - GENERAL
PAINLEVEL_OUTOF10: 0
PAINLEVEL_OUTOF10: 0

## 2022-09-12 NOTE — PROGRESS NOTES
[address]   Progress Note and Procedure Note      Radha Sawant  MEDICAL RECORD NUMBER:  [id number]  AGE: 80 y.o. GENDER: male  : 1940  EPISODE DATE:  2022    Subjective:     Chief Complaint   Patient presents with    Wound Check     Follow up abdomen wound         HISTORY of PRESENT ILLNESS HPI    Radha Sawant is a 80 y.o. male who presents today for wound/ulcer evaluation. History of Wound Context: Abscess. Patient reports that wound started as an abscess that spontaneously erupted a large amount of purulence. Treatment included doxycycline and topical triple antibiotic ointment. He had a skin eruption from either the triple antibiotic ointment or Band-aids, but he believes it was the ointment. Since then, he has been applying Vaseline to the site, but it does not appear to be healing. Last week I ordered topical mupirocin ointment. Today the wound has healed significantly  Wound/Ulcer Pain Timing/Severity: none  Quality of pain: N/A  Severity:  0 / 10   Modifying Factors: None  Associated Signs/Symptoms: scant drainage     Ulcer Identification:  Ulcer Type:  started as an abscess     Contributing Factors: none     Acute Wound: N/A    PAST MEDICAL HISTORY        Diagnosis Date    Basal cell carcinoma 2010    BPH (benign prostatic hyperplasia)     COPD (chronic obstructive pulmonary disease) ([facility] [address]     Detached retina     left eye    GERD (gastroesophageal reflux disease)     Hypothyroidism        PAST SURGICAL HISTORY    Past Surgical History:   Procedure Laterality Date    COLONOSCOPY      polyps.     COLONOSCOPY  2018        EYE SURGERY      left detached retina    HEMORRHOID SURGERY         FAMILY HISTORY    Family History   Problem Relation Age of Onset    Cancer Mother         ovarian    Cancer Father         lung    Cancer Brother 61        lung and throat cancr       SOCIAL HISTORY    Social History     Tobacco Use    Smoking status: Former     Packs/day: 1.00     Years: 50.00     Pack years: 50.00     Types: Cigarettes     Quit date: 1990     Years since quittin.7    Smokeless tobacco: Never   Vaping Use    Vaping Use: Never used   Substance Use Topics    Alcohol use: No    Drug use: No       ALLERGIES    No Known Allergies    MEDICATIONS    Current Outpatient Medications on File Prior to Encounter   Medication Sig Dispense Refill    tamsulosin (FLOMAX) 0.4 MG capsule Take 2 capsules by mouth daily 180 capsule 3    levothyroxine (SYNTHROID) 75 MCG tablet TAKE ONE TABLET BY MOUTH DAILY 90 tablet 2    finasteride (PROSCAR) 5 MG tablet Take 1 tablet by mouth in the morning. 90 tablet 2    Budeson-Glycopyrrol-Formoterol (BREZTRI AEROSPHERE) 160-9-4.8 MCG/ACT AERO Inhale 2 puffs into the lungs in the morning and at bedtime 1 each 0    TRELEGY ELLIPTA 100-62.5-25 MCG/INH AEPB USE 1 INHALATION ORALLY    DAILY 60 each 5    pantoprazole (PROTONIX) 20 MG tablet Take 1 tablet by mouth every morning (before breakfast) 90 tablet 1    Multiple Vitamins-Minerals (THERAPEUTIC MULTIVITAMIN-MINERALS) tablet Take 1 tablet by mouth daily      vitamin B-12 (CYANOCOBALAMIN) 500 MCG tablet Take 500 mcg by mouth daily      albuterol sulfate  (90 Base) MCG/ACT inhaler Inhale 2 puffs into the lungs every 4 hours as needed       loratadine-pseudoephedrine (CLARITIN-D 24 HOUR)  MG per extended release tablet Take 1 tablet by mouth daily 30 tablet 0    Fiber CAPS Take  by mouth daily. No current facility-administered medications on file prior to encounter. REVIEW OF SYSTEMS    Pertinent items are noted in HPI.       Objective:      BP (!) 159/79   Pulse 79   Temp 98.6 °F (37 °C) (Temporal)   Resp 18     Wt Readings from Last 3 Encounters:   22 185 lb (83.9 kg)   22 185 lb (83.9 kg)   08/15/22 183 lb (83 kg)       PHYSICAL EXAM    General Appearance: alert and oriented to person, place and time, well developed and well- nourished, in no acute distress  Skin: warm and dry; open wound on right lateral abdomen is limited to skin breakdown  Head: normocephalic and atraumatic  Eyes: pupils equal, round, and reactive to light, extraocular eye movements intact, conjunctivae normal  Neck: supple  Pulmonary/Chest:  normal air movement, no respiratory distress  Abdomen: soft, non-tender, non-distended, normal bowel sounds  Musculoskeletal: normal range of motion, no joint swelling, deformity or tenderness  Neurologic: reflexes normal and symmetric, no cranial nerve deficit, gait, coordination and speech normal       Assessment:        Problem List Items Addressed This Visit       Open wound of abdominal wall, lateral, subsequent encounter - Primary    Hx of abscess of skin and subcutaneous tissue    Relevant Orders    Initiate Outpatient Wound Care Protocol        Procedure Note  Indications:  Based on my examination of this patient's wound(s)/ulcer(s) today, debridement is required to promote healing and evaluate the wound base. Performed by: JORDYN Mackenzie CNP    Consent obtained:  Yes    Time out taken:  Yes    Pain Control: Anesthetic  Anesthetic: 4% Lidocaine Cream       Debridement: Selective Debridement/Non-Excisional Debridement    Using curette the wound(s)/ulcer(s) was/were debrided down through and including the removal of epidermis and dermis.         Devitalized Tissue Debrided:  slough    Pre Debridement Measurements:  Are located in the Wound/Ulcer Documentation Flow Sheet    Diabetic/Pressure/Non Pressure Ulcers only:  Ulcer: Non-Pressure ulcer, limited to breakdown of skin     Wound/Ulcer #: 1    Post Debridement Measurements:  Wound/Ulcer Descriptions are Pre Debridement except measurements:    Wound 08/29/22 Abdomen Lateral;Right #1,noted (4/1/22), but drained on July 21, 22 (Active)   Wound Image   09/12/22 0948   Wound Etiology Other 08/29/22 0954   Dressing/Treatment Pharmaceutical agent (see MAR); Gauze dressing/dressing sponge;Tape/Soft cloth adhesive tape 09/12/22 1017   Wound Length (cm) 0.4 cm 09/12/22 0948   Wound Width (cm) 0.5 cm 09/12/22 0948   Wound Depth (cm) 0.1 cm 09/12/22 0948   Wound Surface Area (cm^2) 0.2 cm^2 09/12/22 0948   Change in Wound Size % (l*w) 60 09/12/22 0948   Wound Volume (cm^3) 0.02 cm^3 09/12/22 0948   Wound Healing % 80 09/12/22 0948   Post-Procedure Length (cm) 0.5 cm 09/12/22 1004   Post-Procedure Width (cm) 0.6 cm 09/12/22 1004   Post-Procedure Depth (cm) 0.2 cm 09/12/22 1004   Post-Procedure Surface Area (cm^2) 0.3 cm^2 09/12/22 1004   Post-Procedure Volume (cm^3) 0.06 cm^3 09/12/22 1004   Wound Assessment Slough;Granulation tissue 09/12/22 0948   Drainage Amount Scant 09/12/22 0948   Drainage Description Sanguinous 09/12/22 0948   Odor None 09/12/22 0948   Yolanda-wound Assessment Dry/flaky 09/12/22 0948   Margins Attached edges 09/12/22 0948   Wound Thickness Description not for Pressure Injury Full thickness 09/12/22 0948   Number of days: 14          Total Surface Area Debrided:  0.3 sq cm     Estimated Blood Loss:  Minimal    Hemostasis Achieved:  not needed    Procedural Pain:  0  / 10     Post Procedural Pain:  0 / 10     Response to treatment:  Well tolerated by patient. Plan:     Treatment Note please see attached Discharge Instructions    Written patient dismissal instructions given to patient and signed by patient or POA.          Discharge [id number] [facility] Physician Orders and Discharge Instructions  [address] [place] [address], [address] [place] [address]  Telephone: [phone]  [address] 8:30 am - 4:30 pm and Friday 8:30 am - 1:00 pm.          NAME:  Jadiel Cox  YOB: 1940  MEDICAL RECORD NUMBER:  [id number]  TODAYS DATE:  9/12/22         Please wash hands with soap and water prior to and right after  every dressing change            WOUND LOCATION   RIGHT ABDOMINAL WOUND      May rinse wounds with 0.9% saline   Do NOT SCRUB WOUND. Keep wounds dry in the shower unless otherwise instructed by the physician. Topical Treatments:              [x] Apply around the wound:   [] moisturizing lotion  [] Antifungal ointment      [x] No-Sting barrier film   [] Zinc paste      PRIMARY DRESSING:              [x] Mupirocin        SECONDARY DRESSING:                [x] Gauze      2x2                                                   [x] Cover Roll Tape / paper tape                                                                                         HOW OFTEN TO CHANGE DRESSINGS:            [x] Daily   1-2 TIMES A DAY                                  _____________________________________________________________________________________________     Dietary:  Continue your diet as tolerated. Eat heart-healthy foods. These foods include vegetables, fruits, nuts, beans, lean meat, fish, and whole grains. Limit sodium, alcohol, and sugar. Protein is a pratt nutrient in helping to repair damaged tissue and promote new tissue growth. Good sources of protein are milk, yogurt, cheese, fish, lean meat, and beans. If you are a diabetic, having diabetes can make it hard for wounds to heal. So try to keep your blood sugar in its target range.  __________________________________________________________________________________________________     ACTIVITY:   Activity as tolerated  ________________________________________________________________________________________________________________     PAIN:     Please note, A small amount of pain, drainage and/or bleeding from this process might be expected, and is NORMAL. Elevate the affected limb. Use over-the-counter medications you would normally use for pain as permitted by your family doctor.   For persistent pain not relieved by the above interventions, please call your family doctor.  ________________________________________________________________________________  Call your doctor now or seek immediate medical care if:    You have symptoms of infection, such as: Increased pain, swelling, warmth, or redness. Red streaks leading from the area. Pus draining from the area. A fever. _______________________________________________________________________     Return Appointment:  DME/Wound Dressing Supplies Provided by:   (Supply companies distribute one month supply at a time. Please call them directly to reorder supplies when you run out)     ECF or Home Healthcare: Your Home Care Agency is responsible to order your supplies. Return Appointment: With Millie Sicard CNP  in  [address] [place] [address])      may cancel if you are healed                 [] Orders placed during your visit:               Electronically signed by : Kayley Madrid. on 9/12/2022 at 10:06 AM        [address]: Should you experience any significant changes in your wound(s) or have questions about your wound care, please contact the [address] [place] [address] at [address] 8:30 am - 4:30 pm and Friday 8:30 am - 1:00 pm.  If you need help with your wound outside these hours and cannot wait until we are again available, contact your PCP or go to the hospital emergency room. PLEASE NOTE: IF YOU ARE UNABLE TO OBTAIN WOUND SUPPLIES, CONTINUE TO USE THE SUPPLIES YOU HAVE AVAILABLE UNTIL YOU ARE ABLE TO REACH US. KEEP THE WOUND COVERED AT ALL TIMES.            Physician Signature:_______________________     Date: ___________ Time:  ____________               [x] Millie Sicard CNP     [] Dr Alecia Bell    [] Suzanne Anglin CNP              Electronically signed by Millie Sicard, APRN - CNP on 9/12/2022 at 1:56 PM

## 2022-09-13 ENCOUNTER — OFFICE VISIT (OUTPATIENT)
Dept: PULMONOLOGY | Age: 82
End: 2022-09-13
Payer: MEDICARE

## 2022-09-13 VITALS
OXYGEN SATURATION: 98 % | BODY MASS INDEX: 26.04 KG/M2 | RESPIRATION RATE: 21 BRPM | DIASTOLIC BLOOD PRESSURE: 75 MMHG | SYSTOLIC BLOOD PRESSURE: 135 MMHG | HEART RATE: 73 BPM | TEMPERATURE: 97.5 F | HEIGHT: 71 IN | WEIGHT: 186 LBS

## 2022-09-13 DIAGNOSIS — J44.9 COPD WITH ASTHMA (HCC): ICD-10-CM

## 2022-09-13 PROCEDURE — 99213 OFFICE O/P EST LOW 20 MIN: CPT | Performed by: INTERNAL MEDICINE

## 2022-09-13 PROCEDURE — 1123F ACP DISCUSS/DSCN MKR DOCD: CPT | Performed by: INTERNAL MEDICINE

## 2022-09-13 NOTE — ASSESSMENT & PLAN NOTE
Most issues after covid diagnosis. While Trelegy has worked better [employer], his is not clear it is worth the cost.  We discussed value.   He will try reducing Trelegy use to every other day and monitor symptoms and cost.

## 2022-09-13 NOTE — PROGRESS NOTES
REASON FOR CONSULTATION/CC:    Chief Complaint   Patient presents with    Follow-up     Post covid          PCP: Radha Dawn MD    HISTORY OF PRESENT ILLNESS: Ada Huerta is a 80y.o. year old male with a history of  Tobacco abuse who presents          History    Stress test was negative with pulmonary function tests FEV1 69% and DLCO 53%. 6 min walk test was completed with desaturation to 93% at [place] and he was able to walk 420 meters = 1377 feet or 79% of normal for age which is to significant contrast to history given. Positive for COVID Dec 11 and negaitve dec 24th. Chest x-ray June 2021 demonstrating fibrosis          Current history  shortness of breath /copd/ pulmonary fibrosis      Trying to stay active around house. He has significant swelling and stopped Trelegy with concern for side effects . Tried Breztri without improved. He restrted Trelegy and this has improved symptoms some. Having some issues with cost      Objective:   PHYSICAL EXAM:  Blood pressure 135/75, pulse 73, temperature 97.5 °F (36.4 °C), resp. rate 21, height 5' 11\" (1.803 m), weight 186 lb (84.4 kg), SpO2 98 %.'  Body mass index is 25.94 kg/m². Gen: No distress. Eyes:    ENT:    Neck:    Resp: No accessory muscle use. No crackles. No wheezes. No rhonchi. CV: Regular rate. Regular rhythm. No murmur or rub. No edema. GI:    Skin:    Lymph:    M/S: No cyanosis. No clubbing. Neuro: Moves all four extremities. Psych: Oriented x 3. No anxiety. Awake. Alert. Intact judgement and insight. Data Reviewed:          Assessment:     Tobacco abuse  Shortness of breath  Exertional desaturation to 93 %, December 2020  Calcified pulmonary nodule unchanged [id number]      Plan:      Problem List Items Addressed This Visit       COPD with asthma ([address]     Most issues after covid diagnosis. While Trelegy has worked better [employer], his is not clear it is worth the cost.  We discussed value.   He will try reducing Trelegy use to every other day and monitor symptoms and cost.                      This note was transcribed using [address] [employer]. Please disregard any translational errors.     Betty Mu[facility]oz [facility] [phone]

## 2022-09-26 ENCOUNTER — HOSPITAL ENCOUNTER (OUTPATIENT)
Dept: WOUND CARE | Age: 82
Discharge: HOME | End: 2022-09-26

## 2022-12-06 ENCOUNTER — HOSPITAL ENCOUNTER (OUTPATIENT)
Dept: GENERAL RADIOLOGY | Age: 82
Discharge: HOME | End: 2022-12-06
Payer: MEDICARE

## 2022-12-06 ENCOUNTER — HOSPITAL ENCOUNTER (OUTPATIENT)
Age: 82
Discharge: HOME | End: 2022-12-06
Payer: MEDICARE

## 2022-12-06 DIAGNOSIS — R06.02 SHORTNESS OF BREATH: ICD-10-CM

## 2022-12-06 DIAGNOSIS — R05.1 ACUTE COUGH: ICD-10-CM

## 2022-12-06 PROCEDURE — 71046 X-RAY EXAM CHEST 2 VIEWS: CPT

## 2023-01-30 PROBLEM — L98.491 NON-PRESSURE CHRONIC ULCER OF SKIN OF OTHER SITES LIMITED TO BREAKDOWN OF SKIN (HCC): Status: ACTIVE | Noted: 2023-01-30

## 2023-03-06 ENCOUNTER — OFFICE VISIT (OUTPATIENT)
Dept: PULMONOLOGY | Age: 83
End: 2023-03-06
Payer: MEDICARE

## 2023-03-06 VITALS
BODY MASS INDEX: 26.07 KG/M2 | HEIGHT: 71 IN | WEIGHT: 186.2 LBS | TEMPERATURE: 97.4 F | DIASTOLIC BLOOD PRESSURE: 70 MMHG | RESPIRATION RATE: 16 BRPM | HEART RATE: 67 BPM | SYSTOLIC BLOOD PRESSURE: 138 MMHG | OXYGEN SATURATION: 96 %

## 2023-03-06 DIAGNOSIS — J44.9 COPD WITH ASTHMA (HCC): ICD-10-CM

## 2023-03-06 PROCEDURE — 1123F ACP DISCUSS/DSCN MKR DOCD: CPT | Performed by: INTERNAL MEDICINE

## 2023-03-06 PROCEDURE — 99213 OFFICE O/P EST LOW 20 MIN: CPT | Performed by: INTERNAL MEDICINE

## 2023-03-06 NOTE — ASSESSMENT & PLAN NOTE
Discussed fibrosis which is likely secondary to covid / pneumonia rather than IPF. The patient expressed understanding for all. Continue Trelegy.

## 2023-03-06 NOTE — PROGRESS NOTES
REASON FOR CONSULTATION/CC:    Chief Complaint   Patient presents with    COPD         PCP: May Roberts MD    HISTORY OF PRESENT ILLNESS: Brian Calhoun is a [de-identified]y.o. year old male with a history of  Tobacco abuse who presents          History    Stress test was negative with pulmonary function tests FEV1 69% and DLCO 53%. 6 min walk test was completed with desaturation to 93% at [place] and he was able to walk 420 meters = 1377 feet or 79% of normal for age which is to significant contrast to history given. Positive for COVID Dec 11 and negaitve dec 24th. Chest x-ray June 2021 demonstrating fibrosis          Current history  shortness of breath /copd/ pulmonary fibrosis         shortness of breath /copd/ pulmonary fibrosis    He was using Trelegy but did not notice a difference until stopping. Now needs. Objective:   PHYSICAL EXAM:  Blood pressure 138/70, pulse 67, temperature 97.4 °F (36.3 °C), temperature source Infrared, resp. rate 16, height 5' 11\" (1.803 m), weight 186 lb 3.2 oz (84.5 kg), SpO2 96 %.'  Body mass index is 25.97 kg/m². Gen: No distress. Eyes:    ENT:    Neck:    Resp: No accessory muscle use. few crackles @ right base. No wheezes. No rhonchi. CV: Regular rate. Regular rhythm. No murmur or rub. No edema. GI:    Skin:    Lymph:    M/S: No cyanosis. No clubbing. Neuro: Moves all four extremities. Psych: Oriented x 3. No anxiety. Awake. Alert. Intact judgement and insight. Data Reviewed:          Assessment:     Tobacco abuse  Shortness of breath  Exertional desaturation to 93 %, December 2020  Calcified pulmonary nodule unchanged [id number]      Plan:      Problem List Items Addressed This Visit       COPD with asthma ([facility] [address]     Discussed fibrosis which is likely secondary to covid / pneumonia rather than IPF. The patient expressed understanding for all. Continue Trelegy.                            This note was transcribed using Dragon Dictation software. Please disregard any translational errors.     Betty Mu[facility]oz [facility] [phone]

## 2023-03-21 NOTE — TELEPHONE ENCOUNTER
Fredrick king called earlier requesting refill on Stiolto. I do not see where he has had this medication. Was it a sample or did he need refill on Trelegy? Left message for Fredrick king to call back and clarify.

## 2023-03-21 NOTE — TELEPHONE ENCOUNTER
Patient calls back stating he feels the Chasity Lin is helping and would like a prescription sent to his mail order pharmacy

## 2023-06-01 RX ORDER — TAMSULOSIN HYDROCHLORIDE 0.4 MG/1
0.4 CAPSULE ORAL DAILY
COMMUNITY

## 2023-06-05 ENCOUNTER — ANESTHESIA EVENT (OUTPATIENT)
Dept: ENDOSCOPY | Age: 83
End: 2023-06-05
Payer: MEDICARE

## 2023-06-06 ENCOUNTER — ANESTHESIA (OUTPATIENT)
Dept: ENDOSCOPY | Age: 83
End: 2023-06-06
Payer: MEDICARE

## 2023-06-06 ENCOUNTER — HOSPITAL ENCOUNTER (OUTPATIENT)
Age: 83
Setting detail: OUTPATIENT SURGERY
Discharge: HOME | End: 2023-06-06
Attending: INTERNAL MEDICINE | Admitting: INTERNAL MEDICINE
Payer: MEDICARE

## 2023-06-06 VITALS
RESPIRATION RATE: 16 BRPM | DIASTOLIC BLOOD PRESSURE: 67 MMHG | HEART RATE: 62 BPM | OXYGEN SATURATION: 96 % | WEIGHT: 175 LBS | HEIGHT: 71 IN | BODY MASS INDEX: 24.5 KG/M2 | TEMPERATURE: 96.9 F | SYSTOLIC BLOOD PRESSURE: 135 MMHG

## 2023-06-06 PROCEDURE — 7100000010 HC PHASE II RECOVERY - FIRST 15 MIN: Performed by: INTERNAL MEDICINE

## 2023-06-06 PROCEDURE — 3700000001 HC ADD 15 MINUTES (ANESTHESIA): Performed by: INTERNAL MEDICINE

## 2023-06-06 PROCEDURE — 3609027000 HC COLONOSCOPY: Performed by: INTERNAL MEDICINE

## 2023-06-06 PROCEDURE — 7100000011 HC PHASE II RECOVERY - ADDTL 15 MIN: Performed by: INTERNAL MEDICINE

## 2023-06-06 PROCEDURE — 2580000003 HC RX 258: Performed by: NURSE ANESTHETIST, CERTIFIED REGISTERED

## 2023-06-06 PROCEDURE — 3700000000 HC ANESTHESIA ATTENDED CARE: Performed by: INTERNAL MEDICINE

## 2023-06-06 PROCEDURE — 2500000003 HC RX 250 WO HCPCS: Performed by: NURSE ANESTHETIST, CERTIFIED REGISTERED

## 2023-06-06 PROCEDURE — 2580000003 HC RX 258: Performed by: STUDENT IN AN ORGANIZED HEALTH CARE EDUCATION/TRAINING PROGRAM

## 2023-06-06 PROCEDURE — 6360000002 HC RX W HCPCS: Performed by: NURSE ANESTHETIST, CERTIFIED REGISTERED

## 2023-06-06 RX ORDER — SODIUM CHLORIDE 9 MG/ML
INJECTION, SOLUTION INTRAVENOUS CONTINUOUS PRN
Status: DISCONTINUED | OUTPATIENT
Start: 2023-06-06 | End: 2023-06-06 | Stop reason: SDUPTHER

## 2023-06-06 RX ORDER — PROPOFOL 10 MG/ML
INJECTION, EMULSION INTRAVENOUS PRN
Status: DISCONTINUED | OUTPATIENT
Start: 2023-06-06 | End: 2023-06-06 | Stop reason: SDUPTHER

## 2023-06-06 RX ORDER — LIDOCAINE HYDROCHLORIDE 20 MG/ML
INJECTION, SOLUTION EPIDURAL; INFILTRATION; INTRACAUDAL; PERINEURAL PRN
Status: DISCONTINUED | OUTPATIENT
Start: 2023-06-06 | End: 2023-06-06 | Stop reason: SDUPTHER

## 2023-06-06 RX ORDER — PROPOFOL 10 MG/ML
INJECTION, EMULSION INTRAVENOUS CONTINUOUS PRN
Status: DISCONTINUED | OUTPATIENT
Start: 2023-06-06 | End: 2023-06-06 | Stop reason: SDUPTHER

## 2023-06-06 RX ORDER — SODIUM CHLORIDE 0.9 % (FLUSH) 0.9 %
5-40 SYRINGE (ML) INJECTION PRN
Status: DISCONTINUED | OUTPATIENT
Start: 2023-06-06 | End: 2023-06-06 | Stop reason: HOSPADM

## 2023-06-06 RX ORDER — SODIUM CHLORIDE 0.9 % (FLUSH) 0.9 %
5-40 SYRINGE (ML) INJECTION EVERY 12 HOURS SCHEDULED
Status: DISCONTINUED | OUTPATIENT
Start: 2023-06-06 | End: 2023-06-06 | Stop reason: HOSPADM

## 2023-06-06 RX ORDER — SODIUM CHLORIDE 9 MG/ML
INJECTION, SOLUTION INTRAVENOUS PRN
Status: DISCONTINUED | OUTPATIENT
Start: 2023-06-06 | End: 2023-06-06 | Stop reason: HOSPADM

## 2023-06-06 RX ADMIN — PROPOFOL 180 MCG/KG/MIN: 10 INJECTION, EMULSION INTRAVENOUS at 10:21

## 2023-06-06 RX ADMIN — PROPOFOL 20 MG: 10 INJECTION, EMULSION INTRAVENOUS at 10:25

## 2023-06-06 RX ADMIN — LIDOCAINE HYDROCHLORIDE 50 MG: 20 INJECTION, SOLUTION EPIDURAL; INFILTRATION; INTRACAUDAL; PERINEURAL at 10:21

## 2023-06-06 RX ADMIN — SODIUM CHLORIDE 100 ML/HR: 9 INJECTION, SOLUTION INTRAVENOUS at 09:27

## 2023-06-06 RX ADMIN — PROPOFOL 80 MG: 10 INJECTION, EMULSION INTRAVENOUS at 10:21

## 2023-06-06 RX ADMIN — SODIUM CHLORIDE: 9 INJECTION, SOLUTION INTRAVENOUS at 10:09

## 2023-06-06 ASSESSMENT — ENCOUNTER SYMPTOMS
SHORTNESS OF BREATH: 0
DYSPNEA ACTIVITY LEVEL: NO INTERVAL CHANGE

## 2023-06-06 ASSESSMENT — PAIN - FUNCTIONAL ASSESSMENT: PAIN_FUNCTIONAL_ASSESSMENT: NONE - DENIES PAIN

## 2023-06-06 NOTE — BRIEF OP NOTE
Brief Postoperative Note    Shanique Jordan  YOB: 1940  [id number]      Pre-operative Diagnosis: Polyp surveillance    Previous Colonoscopy: Yes  When:  2018  Greater than 3 years? Yes      Post-operative Diagnosis: Same    Procedure: Colonoscopy    The preparation was good.     Anesthesia: MAC    Surgeons/Assistants: Jil Figueroa MD    Estimated Blood Loss: None    Complications: None    Specimens: Was Not Obtained    Findings: See dictated report    Electronically signed by Jil Figueroa MD on 7/10/2017 at 7:45 AM

## 2023-06-06 NOTE — DISCHARGE INSTRUCTIONS
[facility] Discharge Instructions  Colonoscopy    NAME:  Ethan Mercado  YOB: 1940  MEDICAL RECORD NUMBER:  [id number]  DATE:  6/6/2023      After receiving Propofol (Diprivan) for Moderate Sedation:    Do not drive or operate any machinery until tomorrow  Do not sign any legal documents or make any critical decisions  Do not drink alcoholic beverages for 24 hours  Plan to spend a few hours resting before resuming your normal routine  Possible side effects are light headedness and sedation    You may resume your usual diet at home    Resume all your daily medications    Call your physician if any of the following occur:    Severe abdominal distention and/or pain. (Mild distention or cramping is normal after this procedure; this should improve within an hour or two with passage of air)  Fever, chills, nausea or vomiting  You may notice a small amount of blood in your next few bowel movements    If excessive bleeding occurs:  Call your physician immediately or proceed to the nearest Emergency Room    Biopsy Obtained: NO      Recommendations:  No future surveillance colonoscopies required. For questions or concerns please contact your GI physician's 24 hour call center at [phone].

## 2023-06-06 NOTE — OP NOTE
Operative Note      Patient: George Grubbs  YOB: 1940  MRN: [id number]    Date of Procedure: 6/6/2023    Pre-Op Diagnosis Codes:     * History of colon polyps [Z86.010]    Post-Op Diagnosis: Same       Procedure(s):  COLORECTAL CANCER SCREENING, NOT HIGH RISK    Surgeon(s):  James Antunez MD    Assistant:   * No surgical staff found *    Anesthesia: Monitor Anesthesia Care    Estimated Blood Loss (mL): None    Complications: None    Specimens:   * No specimens in log *    Implants:  * No implants in log *      Drains: * No LDAs found *    Findings: See dictated report      Detailed Description of Procedure:   See dictated report    Electronically signed by James Antunez MD on 6/6/2023 at 10:45 AM

## 2023-06-06 NOTE — ANESTHESIA PRE PROCEDURE
Department of Anesthesiology  Preprocedure Note       Name:  Elvia Victor   Age:  [de-identified] y.o.  :  1940                                          MRN:  [id number]         Date:  2023      Surgeon: Yesika Chung):  Ovidio Rendon MD    Procedure: Procedure(s):  COLORECTAL CANCER SCREENING, NOT HIGH RISK    Medications prior to admission:   Prior to Admission medications    Medication Sig Start Date End Date Taking?  Authorizing Provider   psyllium (KONSYL) 28.3 % PACK Take 1 packet by mouth 2 times daily   Yes Historical Provider, MD   tamsulosin (FLOMAX) 0.4 MG capsule Take 1 capsule by mouth daily   Yes Historical Provider, MD   finasteride (PROSCAR) 5 MG tablet TAKE 1 TABLET EVERY MORNING 23   Ave Esposito MD   tadalafil (CIALIS) 5 MG tablet TAKE ONE TABLET BY MOUTH DAILY 23   Ave Esposito MD   pantoprazole (PROTONIX) 20 MG tablet TAKE ONE TABLET BY MOUTH EVERY MORNING BEFORE BREAKFAST 23   Ave Esposito MD   tiotropium-olodaterol (STIOLTO RESPIMAT) 2.5-2.5 MCG/ACT AERS Inhale 2 puffs into the lungs daily 3/21/23   Megha Damian MD   albuterol sulfate HFA (PROVENTIL;VENTOLIN;PROAIR) 108 (90 Base) MCG/ACT inhaler Inhale 2 puffs into the lungs every 4 hours as needed for Shortness of Breath 22   JORDYN Dent CNP   levothyroxine (SYNTHROID) 75 MCG tablet TAKE 1 TABLET DAILY 10/24/22   Ave Esposito MD   Multiple Vitamins-Minerals (THERAPEUTIC MULTIVITAMIN-MINERALS) tablet Take 1 tablet by mouth daily    Historical Provider, MD   vitamin B-12 (CYANOCOBALAMIN) 500 MCG tablet Take 1 tablet by mouth daily    Historical Provider, MD   loratadine-pseudoephedrine (CLARITIN-D 24 HOUR)  MG per extended release tablet Take 1 tablet by mouth daily 17   JORDYN Marie CNP       Current medications:    Current Facility-Administered Medications   Medication Dose Route Frequency Provider Last Rate Last Admin    sodium chloride flush 0.9 % injection 5-40 mL

## 2023-06-06 NOTE — ANESTHESIA POSTPROCEDURE EVALUATION
Department of Anesthesiology  Postprocedure Note    Patient: Mamta Banerjee  MRN: [id number]  YOB: 1940  Date of evaluation: 6/6/2023      Procedure Summary     Date: 06/06/23 Room / Location: [address] [place] [address]    Anesthesia Start: 1009 Anesthesia Stop: 6539    Procedure: COLORECTAL CANCER SCREENING, NOT HIGH RISK Diagnosis:       History of colon polyps      (History of colon polyps)    Surgeons: Karol Sen MD Responsible Provider: Mikaela Marcum MD    Anesthesia Type: MAC ASA Status: 3          Anesthesia Type: No value filed. Nato Phase I: Nato Score: 10    Nato Phase II: Nato Score: 9      Anesthesia Post Evaluation    Patient location during evaluation: PACU  Patient participation: complete - patient participated  Level of consciousness: awake  Airway patency: patent  Nausea & Vomiting: no nausea and no vomiting  Cardiovascular status: blood pressure returned to baseline  Respiratory status: acceptable  Hydration status: stable  Comments: Vital signs stable  OK to discharge from Stage I post anesthesia care.   Care transferred from Anesthesiology department on discharge from perioperative area   Multimodal analgesia pain management approach

## 2023-06-06 NOTE — H&P
with moderate systemic disease with functional limitations    II (soft palate, uvula, fauces visible)  ASSESSMENT AND PLAN:    1. Patient is a [de-identified] y.o. male here for Colonoscopy  2. Procedure options, risks and benefits reviewed with patient who expresses understanding.

## 2023-06-06 NOTE — PROGRESS NOTES
Patient and responsible adult verbalized understanding of discharge instructions, sedation medication, and potential complications including pain. Patient instructed to call Doctor if complications occur. PAD s/p endarterectomy LLE, ESRD on HD, h/o R AKA PAD s/p endarterectomy LLE,angioplasty subclavian & innominate veins ;  ESRD on HD, h/o R AKA PAD s/p endarterectomy ileofemoral artereries LLE via L groin approach 4/1,angioplasty R subclavian & innominate veins 3/28/19  ;  ESRD on HD, h/o R AKA

## 2023-06-07 ENCOUNTER — TELEPHONE (OUTPATIENT)
Dept: PULMONOLOGY | Age: 83
End: 2023-06-07

## 2023-06-07 DIAGNOSIS — J44.9 COPD WITH ASTHMA (HCC): Primary | ICD-10-CM

## 2023-06-07 RX ORDER — BUDESONIDE AND FORMOTEROL FUMARATE DIHYDRATE 160; 4.5 UG/1; UG/1
2 AEROSOL RESPIRATORY (INHALATION) 2 TIMES DAILY
Qty: 10.2 G | Refills: 5 | Status: SHIPPED | OUTPATIENT
Start: 2023-06-07

## 2023-06-07 NOTE — TELEPHONE ENCOUNTER
Have him stop Stiolto . After symptoms or side effects have resolved then try Symbicort . prescription for Symbicort sent.

## 2023-06-07 NOTE — TELEPHONE ENCOUNTER
Keenan Abebe states that [employer] is causing him to be up all night urinating. He has a rash on his feet and nursing home up his legs. Nothing he's put on it has been able to get rid of it. He's had this rash about a month. He attributes this to [employer] as well. He would like a different inhaler. Please advise. Thank you!

## 2023-06-07 NOTE — PROCEDURES
[address] [place] [address] [facility]                                 PROCEDURE NOTE    PATIENT NAME: Niecy Key                    :        1940  MED REC NO:   [id number]                          ROOM:  ACCOUNT NO:   [de-identified]                           ADMIT DATE: 2023  PROVIDER:     Marielle Licea MD    DATE OF PROCEDURE:  2023    REFERRING PROVIDER:  Tristin Hong MD    PATIENT HISTORY:  An 46-year-old male, outpatient. OPERATION PERFORMED:  Colonoscopy. SURGEON:  Sarah Robison MD    INSTRUMENT USED:  Olympus PCF-H180AL. ANESTHESIA:  The patient was premedicated with Diprivan intravenously as  administered by the anesthesiology service. INDICATIONS:  The patient has a history of colonic polyps and a family  history of colon cancer. PROCEDURE:  The digital and anal exams were remarkable for multiple  external skin tags. The colonoscope was inserted to the cecum. The  prep was alternately good to fair. Where the prep was fair, lesions  such as small polyps or vascular ectasias could have been missed. No  mucosal lesions were identified. There was incidental left-sided  diverticulosis. ESTIMATED BLOOD LOSS:  None. IMPRESSION:  Left-sided diverticulosis. PLAN:  Given the patient's age, no future surveillance colonoscopies are  required. SARAH Gilbert MD    D: 2023 10:59:41       T: 2023 11:04:24     [id number]  Job#: [id number]     Doc#: [id number]    CC:  Marielle Licea MD

## 2023-06-28 ENCOUNTER — HOSPITAL ENCOUNTER (OUTPATIENT)
Dept: VASCULAR LAB | Age: 83
Discharge: HOME | End: 2023-06-28
Payer: MEDICARE

## 2023-06-28 DIAGNOSIS — I73.9 CLAUDICATION (HCC): ICD-10-CM

## 2023-06-28 PROCEDURE — 93922 UPR/L XTREMITY ART 2 LEVELS: CPT

## 2023-08-21 ENCOUNTER — OFFICE VISIT (OUTPATIENT)
Dept: PULMONOLOGY | Age: 83
End: 2023-08-21
Payer: MEDICARE

## 2023-08-21 VITALS
DIASTOLIC BLOOD PRESSURE: 70 MMHG | SYSTOLIC BLOOD PRESSURE: 120 MMHG | RESPIRATION RATE: 16 BRPM | BODY MASS INDEX: 24.56 KG/M2 | OXYGEN SATURATION: 94 % | TEMPERATURE: 97.6 F | HEIGHT: 71 IN | HEART RATE: 75 BPM | WEIGHT: 175.4 LBS

## 2023-08-21 DIAGNOSIS — J44.9 COPD WITH ASTHMA (HCC): ICD-10-CM

## 2023-08-21 PROCEDURE — 99212 OFFICE O/P EST SF 10 MIN: CPT | Performed by: INTERNAL MEDICINE

## 2023-08-21 PROCEDURE — 1123F ACP DISCUSS/DSCN MKR DOCD: CPT | Performed by: INTERNAL MEDICINE

## 2023-08-21 NOTE — ASSESSMENT & PLAN NOTE
His primary symptoms are likely driven from pulmonary fibrosis. He has tried [employer], [employer], Trelegy without significant benefit. He has had side effects with this medication. Cardiac work-up was negative in 2020. We discussed consideration for further trial of medications versus work-up. We also discussed referral to pulmonary rehab. At this time, he would like to do none of these and come back as needed.

## 2024-02-05 PROBLEM — I73.9 CLAUDICATION (HCC): Status: ACTIVE | Noted: 2024-02-05

## 2024-05-10 ENCOUNTER — HOSPITAL ENCOUNTER (OUTPATIENT)
Age: 84
Discharge: HOME | End: 2024-05-10
Payer: MEDICARE

## 2024-05-10 ENCOUNTER — HOSPITAL ENCOUNTER (OUTPATIENT)
Dept: GENERAL RADIOLOGY | Age: 84
Discharge: HOME | End: 2024-05-10
Attending: STUDENT IN AN ORGANIZED HEALTH CARE EDUCATION/TRAINING PROGRAM
Payer: MEDICARE

## 2024-05-10 DIAGNOSIS — G95.9 CERVICAL MYELOPATHY (HCC): ICD-10-CM

## 2024-05-10 PROCEDURE — 70200 X-RAY EXAM OF EYE SOCKETS: CPT

## 2024-05-13 ENCOUNTER — HOSPITAL ENCOUNTER (OUTPATIENT)
Dept: MRI IMAGING | Age: 84
Discharge: HOME | End: 2024-05-13
Attending: STUDENT IN AN ORGANIZED HEALTH CARE EDUCATION/TRAINING PROGRAM
Payer: MEDICARE

## 2024-05-13 DIAGNOSIS — G95.9 CERVICAL MYELOPATHY (HCC): ICD-10-CM

## 2024-05-13 PROCEDURE — 72141 MRI NECK SPINE W/O DYE: CPT

## 2024-10-30 ENCOUNTER — OFFICE VISIT (OUTPATIENT)
Dept: PULMONOLOGY | Age: 84
End: 2024-10-30
Payer: MEDICARE

## 2024-10-30 VITALS
DIASTOLIC BLOOD PRESSURE: 76 MMHG | WEIGHT: 186.4 LBS | HEART RATE: 71 BPM | TEMPERATURE: 97.6 F | BODY MASS INDEX: 26.1 KG/M2 | HEIGHT: 71 IN | OXYGEN SATURATION: 93 % | RESPIRATION RATE: 18 BRPM | SYSTOLIC BLOOD PRESSURE: 148 MMHG

## 2024-10-30 DIAGNOSIS — J44.89 COPD WITH ASTHMA (HCC): Primary | ICD-10-CM

## 2024-10-30 DIAGNOSIS — J96.11 CHRONIC HYPOXEMIC RESPIRATORY FAILURE: ICD-10-CM

## 2024-10-30 PROCEDURE — 1123F ACP DISCUSS/DSCN MKR DOCD: CPT | Performed by: INTERNAL MEDICINE

## 2024-10-30 PROCEDURE — 1159F MED LIST DOCD IN RCRD: CPT | Performed by: INTERNAL MEDICINE

## 2024-10-30 PROCEDURE — 99214 OFFICE O/P EST MOD 30 MIN: CPT | Performed by: INTERNAL MEDICINE

## 2024-10-30 NOTE — PROGRESS NOTES
REASON FOR CONSULTATION/CC:    Chief Complaint   Patient presents with    COPD         PCP: Can Real MD    HISTORY OF PRESENT ILLNESS: Rodger Morgan is a 83 y.o. year old male with a history of  Tobacco abuse who presents          History    Stress test was negative with pulmonary function tests FEV1 69% and DLCO 53%.    6 min walk test was completed with desaturation to 93% at [place] and he was able to walk 420 meters = 1377 feet or 79% of normal for age which is to significant contrast to history given.     Positive for COVID Dec 11 and negaitve dec 24th.  Chest x-ray June 2021 demonstrating fibrosis          Current history       He as not had benefit from  Trelegy   Symbicort  Stiolto   albuterol           Continues to walk and stay active.      Now has albuterol without benefit.  No benefit from Trelegy, Symbicort , Stiolto .      Believes getting worse.     Chronic hypoxemia.   6 mwt with exertional desaturation. Has o2  not using much  Will desaturate to high 80's .    Has portable tank and and concentrator at home.             Objective:   PHYSICAL EXAM:  Blood pressure (!) 148/76, pulse 71, temperature 97.6 °F (36.4 °C), resp. rate 18, height 1.803 m (5' 11\"), weight 84.6 kg (186 lb 6.4 oz), SpO2 93%.'  Body mass index is 26 kg/m².      Gen: No distress.    Eyes:    ENT:    Neck:    Resp: No accessory muscle use. few crackles @   bases  No wheezes. No rhonchi.    CV: Regular rate. Regular rhythm. No murmur or rub. No edema.   GI:    Skin:    Lymph:    M/S: No cyanosis. No clubbing.     Neuro: Moves all four extremities.   Psych: Oriented x 3. No anxiety.  Awake. Alert. Intact judgement and insight.          Data Reviewed:          Assessment:     Tobacco abuse  Shortness of breath  Exertional desaturation to 93 %, December 2020  Calcified pulmonary nodule unchanged [id number]      Plan:      Problem List Items Addressed This Visit       COPD with asthma (HCC) - Primary      Worsening shortness of

## 2024-10-30 NOTE — ASSESSMENT & PLAN NOTE
Rodger Morgan continues to need and benefit from supplemental oxygen. he is using oxygen continuously at 2 L NC as needed.

## 2024-10-30 NOTE — ASSESSMENT & PLAN NOTE
Worsening shortness of breath.      He has tired  albuterol,  Stiolto, Symbicort, Trelegy without significant benefit.     Will get chest X-ray and pulmonary function tests to assess change.

## 2024-10-31 ENCOUNTER — HOSPITAL ENCOUNTER (OUTPATIENT)
Dept: ULTRASOUND IMAGING | Age: 84
Discharge: HOME | End: 2024-10-31
Payer: MEDICARE

## 2024-10-31 DIAGNOSIS — R31.29 OTHER MICROSCOPIC HEMATURIA: ICD-10-CM

## 2024-10-31 PROCEDURE — 76770 US EXAM ABDO BACK WALL COMP: CPT

## 2024-11-15 ENCOUNTER — HOSPITAL ENCOUNTER (OUTPATIENT)
Dept: PULMONOLOGY | Age: 84
Discharge: HOME | End: 2024-11-15
Attending: INTERNAL MEDICINE
Payer: MEDICARE

## 2024-11-15 ENCOUNTER — HOSPITAL ENCOUNTER (OUTPATIENT)
Dept: GENERAL RADIOLOGY | Age: 84
Discharge: HOME | End: 2024-11-15
Attending: INTERNAL MEDICINE
Payer: MEDICARE

## 2024-11-15 DIAGNOSIS — J44.89 COPD WITH ASTHMA (HCC): ICD-10-CM

## 2024-11-15 LAB
DLCO %PRED: 47 %
DLCO PRED: NORMAL
DLCO/VA %PRED: NORMAL
DLCO/VA PRED: NORMAL
DLCO/VA: NORMAL
DLCO: NORMAL
EXPIRATORY TIME-POST: NORMAL
EXPIRATORY TIME: NORMAL
FEF 25-75 %CHNG: NORMAL
FEF 25-75 POST %PRED: NORMAL
FEF 25-75% %PRED-PRE: NORMAL
FEF 25-75% PRED: NORMAL
FEF 25-75-POST: NORMAL
FEF 25-75-PRE: NORMAL
FEV1 %PRED-POST: 58 %
FEV1 %PRED-PRE: 45 %
FEV1 PRED: NORMAL
FEV1-POST: NORMAL
FEV1-PRE: NORMAL
FEV1/FVC %PRED-POST: NORMAL
FEV1/FVC %PRED-PRE: NORMAL
FEV1/FVC PRED: NORMAL
FEV1/FVC-POST: 45 %
FEV1/FVC-PRE: 45 %
FVC %PRED-POST: NORMAL
FVC %PRED-PRE: NORMAL
FVC PRED: NORMAL
FVC-POST: NORMAL
FVC-PRE: NORMAL
GAW %PRED: NORMAL
GAW PRED: NORMAL
GAW: NORMAL
IC PRE %PRED: NORMAL
IC PRED: NORMAL
IC: NORMAL
MEP: NORMAL
MIP: NORMAL
MVV %PRED-PRE: NORMAL
MVV PRED: NORMAL
MVV-PRE: NORMAL
PEF %PRED-POST: NORMAL
PEF %PRED-PRE: NORMAL
PEF PRED: NORMAL
PEF%CHNG: NORMAL
PEF-POST: NORMAL
PEF-PRE: NORMAL
RAW %PRED: NORMAL
RAW PRED: NORMAL
RAW: NORMAL
RV PRE %PRED: NORMAL
RV PRED: NORMAL
RV: NORMAL
SVC %PRED: NORMAL
SVC PRED: NORMAL
SVC: NORMAL
TLC PRE %PRED: 111 %
TLC PRED: NORMAL
TLC: NORMAL
VA %PRED: NORMAL
VA PRED: NORMAL
VA: NORMAL
VTG %PRED: NORMAL
VTG PRED: NORMAL
VTG: NORMAL

## 2024-11-15 PROCEDURE — 94664 DEMO&/EVAL PT USE INHALER: CPT

## 2024-11-15 PROCEDURE — 94726 PLETHYSMOGRAPHY LUNG VOLUMES: CPT

## 2024-11-15 PROCEDURE — 6370000000 HC RX 637 (ALT 250 FOR IP): Performed by: INTERNAL MEDICINE

## 2024-11-15 PROCEDURE — 94060 EVALUATION OF WHEEZING: CPT

## 2024-11-15 PROCEDURE — 94729 DIFFUSING CAPACITY: CPT

## 2024-11-15 PROCEDURE — 94640 AIRWAY INHALATION TREATMENT: CPT

## 2024-11-15 PROCEDURE — 71046 X-RAY EXAM CHEST 2 VIEWS: CPT

## 2024-11-15 RX ORDER — ALBUTEROL SULFATE 90 UG/1
4 INHALANT RESPIRATORY (INHALATION) ONCE
Status: COMPLETED | OUTPATIENT
Start: 2024-11-15 | End: 2024-11-15

## 2024-11-15 RX ADMIN — ALBUTEROL SULFATE 4 PUFF: 90 AEROSOL, METERED RESPIRATORY (INHALATION) at 12:41

## 2024-11-15 ASSESSMENT — PULMONARY FUNCTION TESTS
FEV1_PERCENT_PREDICTED_PRE: 45
FEV1_PERCENT_PREDICTED_POST: 58
FEV1/FVC_PRE: 45
FEV1/FVC_POST: 45

## 2024-11-19 DIAGNOSIS — J44.89 COPD WITH ASTHMA (HCC): Primary | ICD-10-CM

## 2024-11-21 DIAGNOSIS — J44.89 COPD WITH ASTHMA (HCC): ICD-10-CM

## 2024-11-26 LAB
A1AT PHENOTYP SERPL-IMP: NORMAL
A1AT SERPL-MCNC: 96 MG/DL (ref 90–200)

## 2024-11-26 NOTE — RESULT ENCOUNTER NOTE
Short Version:   You get 2 genes (one from Mom and one from Dad).  You received one normal gene and one abnormal gene.  While this is enough to protect you, it is not the full protection you would have received from 2 normal genes.  This has lead to emphysema with smoking.     Further, if you have kids, you would have passed one of the two genes on.  They may want to be tested for alpha 1 antitrypsin.

## 2025-02-11 ENCOUNTER — OFFICE VISIT (OUTPATIENT)
Dept: PULMONOLOGY | Age: 85
End: 2025-02-11
Payer: MEDICARE

## 2025-02-11 VITALS
HEIGHT: 70 IN | RESPIRATION RATE: 18 BRPM | WEIGHT: 184.4 LBS | OXYGEN SATURATION: 93 % | TEMPERATURE: 97.5 F | HEART RATE: 75 BPM | BODY MASS INDEX: 26.4 KG/M2 | SYSTOLIC BLOOD PRESSURE: 170 MMHG | DIASTOLIC BLOOD PRESSURE: 78 MMHG

## 2025-02-11 DIAGNOSIS — J44.89 COPD WITH ASTHMA (HCC): ICD-10-CM

## 2025-02-11 DIAGNOSIS — J96.11 CHRONIC HYPOXEMIC RESPIRATORY FAILURE: ICD-10-CM

## 2025-02-11 DIAGNOSIS — J44.89 COPD WITH ASTHMA (HCC): Primary | ICD-10-CM

## 2025-02-11 LAB
BASOPHILS # BLD: 0.1 K/UL (ref 0–0.2)
BASOPHILS NFR BLD: 1.6 %
DEPRECATED RDW RBC AUTO: 17.6 % (ref 12.4–15.4)
EOSINOPHIL # BLD: 0.3 K/UL (ref 0–0.6)
EOSINOPHIL NFR BLD: 3.8 %
HCT VFR BLD AUTO: 30.1 % (ref 40.5–52.5)
HGB BLD-MCNC: 10.2 G/DL (ref 13.5–17.5)
LYMPHOCYTES # BLD: 2.1 K/UL (ref 1–5.1)
LYMPHOCYTES NFR BLD: 31.2 %
MCH RBC QN AUTO: 33.3 PG (ref 26–34)
MCHC RBC AUTO-ENTMCNC: 33.7 G/DL (ref 31–36)
MCV RBC AUTO: 98.8 FL (ref 80–100)
MONOCYTES # BLD: 1.4 K/UL (ref 0–1.3)
MONOCYTES NFR BLD: 19.9 %
NEUTROPHILS # BLD: 3 K/UL (ref 1.7–7.7)
NEUTROPHILS NFR BLD: 43.5 %
PLATELET # BLD AUTO: 236 K/UL (ref 135–450)
PMV BLD AUTO: 8.3 FL (ref 5–10.5)
RBC # BLD AUTO: 3.05 M/UL (ref 4.2–5.9)
WBC # BLD AUTO: 6.9 K/UL (ref 4–11)

## 2025-02-11 PROCEDURE — 1159F MED LIST DOCD IN RCRD: CPT | Performed by: INTERNAL MEDICINE

## 2025-02-11 PROCEDURE — 1123F ACP DISCUSS/DSCN MKR DOCD: CPT | Performed by: INTERNAL MEDICINE

## 2025-02-11 PROCEDURE — 99214 OFFICE O/P EST MOD 30 MIN: CPT | Performed by: INTERNAL MEDICINE

## 2025-02-11 RX ORDER — IPRATROPIUM BROMIDE AND ALBUTEROL SULFATE 2.5; .5 MG/3ML; MG/3ML
1 SOLUTION RESPIRATORY (INHALATION) EVERY 4 HOURS
Qty: 360 ML | Refills: 11 | Status: SHIPPED | OUTPATIENT
Start: 2025-02-11

## 2025-02-11 RX ORDER — NEBULIZER ACCESSORIES
1 KIT MISCELLANEOUS DAILY
Qty: 1 KIT | Refills: 0 | Status: SHIPPED | OUTPATIENT
Start: 2025-02-11

## 2025-02-11 NOTE — PROGRESS NOTES
candidate.  He has not responded to multiple different types of inhalers.  Will try to nebulizer.  DuoNebs prescribed.  Depending on response, consider Oskar    Improving exercise capacity with oxygen.    Interested in Dupixent.  Last eosinophil count 200, 1-year ago.  Repeat CBC and respiratory allergen profile to assess candidacy for Dupixent.         Relevant Medications    Respiratory Therapy Supplies (NEBULIZER/TUBING/MOUTHPIECE) KIT    ipratropium 0.5 mg-albuterol 2.5 mg (DUONEB) 0.5-2.5 (3) MG/3ML SOLN nebulizer solution    Other Relevant Orders    CBC with Auto Differential    Allergen, Respiratory, Region 5 Panel    Chronic hypoxemic respiratory failure     Rodger Morgan continues to need and benefit from supplemental oxygen. he is using oxygen continuously at 5 L NC.  Discussed increasing and decreasing to save battery.                             This note was transcribed using Dragon Dictation software. Please disregard any translational errors.    STEPHEN MCCOY   [facility]  [phone]

## 2025-02-11 NOTE — ASSESSMENT & PLAN NOTE
Rodger Morgan continues to need and benefit from supplemental oxygen. he is using oxygen continuously at 5 L NC.  Discussed increasing and decreasing to save battery.

## 2025-02-11 NOTE — ASSESSMENT & PLAN NOTE
Repeat pulmonary function test with worsening FEV1.  Down to 45 percent.  While the patient has air trapping, and hyperinflation, he is not a Schulenburg valve candidate.  At the age 84, he is not a transplant candidate.  He has not responded to multiple different types of inhalers.  Will try to nebulizer.  DuoNebs prescribed.  Depending on response, consider Oskar    Improving exercise capacity with oxygen.    Interested in Dupixent.  Last eosinophil count 200, 1-year ago.  Repeat CBC and respiratory allergen profile to assess candidacy for Dupixent.

## 2025-02-12 NOTE — RESULT ENCOUNTER NOTE
Continued anemia consistent with known MDS.  Mild monocytosis.  I checked this to see if he could be a Dupixent candidate  .  Pending respiratory allergen profile.

## 2025-02-14 LAB
A ALTERNATA IGE QN: <0.1 KU/L (ref 0–0.34)
A FUMIGATUS IGE QN: <0.1 KU/L (ref 0–0.34)
AMER SYCAMORE IGE QN: <0.1 KU/L (ref 0–0.34)
BERMUDA GRASS IGE QN: <0.1 KU/L (ref 0–0.34)
BOXELDER IGE QN: <0.1 KU/L (ref 0–0.34)
C SPHAEROSPERMUM IGE QN: <0.1 KUL/L (ref 0–0.34)
CALIF WALNUT IGE QN: <0.1 KU/L (ref 0–0.34)
CAT DANDER IGE QN: <0.1 KU/L (ref 0–0.34)
CMN PIGWEED IGE QN: <0.1 KU/L (ref 0–0.34)
COMMON RAGWEED IGE QN: <0.1 KU/L (ref 0–0.34)
COTTONWOOD IGE QN: <0.1 KU/L (ref 0–0.34)
D FARINAE IGE QN: <0.1 KU/L (ref 0–0.34)
D PTERONYSS IGE QN: <0.1 KU/L (ref 0–0.34)
DOG DANDER IGE QN: <0.1 KU/L (ref 0–0.34)
IGE SERPL-ACNC: 11 IU/ML (ref 0–100)
M RACEMOSUS IGE QN: <0.1 KU/L (ref 0–0.34)
MOUSE EPITH IGE QN: <0.1 KU/L (ref 0–0.34)
P NOTATUM IGE QN: <0.1 KU/L (ref 0–0.34)
PECAN/HICK TREE IGE QN: <0.1 KU/L (ref 0–0.34)
RED CEDAR IGE QN: <0.1 KU/L (ref 0–0.34)
ROACH IGE QN: <0.1 KU/L (ref 0–0.34)
SALTWORT IGE QN: <0.1 KU/L (ref 0–0.34)
SHEEP SORREL IGE QN: <0.1 KU/L (ref 0–0.34)
SILVER BIRCH IGE QN: <0.1 KU/L (ref 0–0.34)
TIMOTHY IGE QN: <0.1 KU/L (ref 0–0.34)
WHITE ASH IGE QN: <0.1 KU/L (ref 0–0.34)
WHITE ELM IGE QN: <0.1 KU/L (ref 0–0.34)
WHITE MULBERRY IGE QN: <0.1 KU/L (ref 0–0.34)
WHITE OAK IGE QN: <0.1 KU/L (ref 0–0.34)

## 2025-03-07 PROBLEM — G95.9 CERVICAL MYELOPATHY (HCC): Status: ACTIVE | Noted: 2025-03-07

## 2025-04-14 ENCOUNTER — OFFICE VISIT (OUTPATIENT)
Dept: PULMONOLOGY | Age: 85
End: 2025-04-14
Payer: MEDICARE

## 2025-04-14 VITALS
HEART RATE: 86 BPM | RESPIRATION RATE: 18 BRPM | TEMPERATURE: 96.9 F | BODY MASS INDEX: 25.48 KG/M2 | OXYGEN SATURATION: 92 % | SYSTOLIC BLOOD PRESSURE: 132 MMHG | DIASTOLIC BLOOD PRESSURE: 74 MMHG | WEIGHT: 182 LBS | HEIGHT: 71 IN

## 2025-04-14 DIAGNOSIS — J96.11 CHRONIC HYPOXEMIC RESPIRATORY FAILURE: ICD-10-CM

## 2025-04-14 DIAGNOSIS — J44.89 COPD WITH ASTHMA (HCC): Primary | ICD-10-CM

## 2025-04-14 DIAGNOSIS — J84.10 POSTINFLAMMATORY PULMONARY FIBROSIS (HCC): ICD-10-CM

## 2025-04-14 PROCEDURE — 99214 OFFICE O/P EST MOD 30 MIN: CPT | Performed by: INTERNAL MEDICINE

## 2025-04-14 PROCEDURE — 1159F MED LIST DOCD IN RCRD: CPT | Performed by: INTERNAL MEDICINE

## 2025-04-14 PROCEDURE — 1123F ACP DISCUSS/DSCN MKR DOCD: CPT | Performed by: INTERNAL MEDICINE

## 2025-04-14 NOTE — PROGRESS NOTES
REASON FOR CONSULTATION/CC:    Chief Complaint   Patient presents with    COPD         PCP: Can Real MD    HISTORY OF PRESENT ILLNESS: Rodger Morgan is a 84 y.o. year old male with a history of  Tobacco abuse who presents          History    Stress test was negative with pulmonary function tests FEV1 69% and DLCO 53%.    6 min walk test was completed with desaturation to 93% at [place] and he was able to walk 420 meters = 1377 feet or 79% of normal for age which is to significant contrast to history given.     Positive for COVID Dec 11 and negaitve dec 24th.  Chest x-ray June 2021 demonstrating fibrosis    The patient (or guardian, if applicable) and other individuals in attendance with the patient were advised that Artificial Intelligence will be utilized during this visit to record, process the conversation to generate a clinical note, and support improvement of the AI technology. The patient (or guardian, if applicable) and other individuals in attendance at the appointment consented to the use of AI, including the recording.                    Pulmonary treatment History   He as not had benefit from  Trelegy   Symbicort  Stiolto   albuterol              Current history     Copd  Chronic hypoxemia       History of Present Illness  The patient is a 84-year-old  male who is here to follow up for his COPD.    He reports that his nebulizer, which he uses 4 times daily with DuoNeb, has been effective in managing his COPD symptoms. However, he experiences a raspy throat as a side effect, necessitating the use of Halls lozenges to prevent coughing. Previously, he used the nebulizer 5 times daily but reduced the frequency due to its impact on his urination at night. He expresses interest in further reducing the frequency to 3 times daily. He utilizes a portable oxygen concentrator during ambulation, set at 3 liters, and increases it to 4 liters when walking uphill or in cold weather. He does not require